# Patient Record
Sex: MALE | Race: WHITE | NOT HISPANIC OR LATINO | Employment: UNEMPLOYED | URBAN - NONMETROPOLITAN AREA
[De-identification: names, ages, dates, MRNs, and addresses within clinical notes are randomized per-mention and may not be internally consistent; named-entity substitution may affect disease eponyms.]

---

## 2023-07-17 ENCOUNTER — HOSPITAL ENCOUNTER (EMERGENCY)
Facility: HOSPITAL | Age: 38
Discharge: PSYCHIATRIC OR SUBSTANCE USE FACILITY | DRG: 897 | End: 2023-07-17
Attending: STUDENT IN AN ORGANIZED HEALTH CARE EDUCATION/TRAINING PROGRAM | Admitting: STUDENT IN AN ORGANIZED HEALTH CARE EDUCATION/TRAINING PROGRAM
Payer: COMMERCIAL

## 2023-07-17 ENCOUNTER — HOSPITAL ENCOUNTER (INPATIENT)
Facility: HOSPITAL | Age: 38
LOS: 5 days | Discharge: HOME | DRG: 897 | End: 2023-07-22
Attending: PSYCHIATRY & NEUROLOGY | Admitting: PSYCHIATRY & NEUROLOGY
Payer: COMMERCIAL

## 2023-07-17 VITALS
WEIGHT: 150 LBS | SYSTOLIC BLOOD PRESSURE: 144 MMHG | RESPIRATION RATE: 18 BRPM | TEMPERATURE: 99.1 F | OXYGEN SATURATION: 99 % | HEART RATE: 110 BPM | BODY MASS INDEX: 24.11 KG/M2 | DIASTOLIC BLOOD PRESSURE: 100 MMHG | HEIGHT: 66 IN

## 2023-07-17 DIAGNOSIS — F15.10 METHAMPHETAMINE ABUSE: ICD-10-CM

## 2023-07-17 DIAGNOSIS — F11.20 BUPRENORPHINE DEPENDENCE: ICD-10-CM

## 2023-07-17 DIAGNOSIS — F10.10 ALCOHOL ABUSE: Primary | ICD-10-CM

## 2023-07-17 PROBLEM — F19.10 SUBSTANCE ABUSE: Status: ACTIVE | Noted: 2023-07-17

## 2023-07-17 LAB
ALBUMIN SERPL-MCNC: 4.4 G/DL (ref 3.5–5.2)
ALBUMIN/GLOB SERPL: 1.2 G/DL
ALP SERPL-CCNC: 144 U/L (ref 39–117)
ALT SERPL W P-5'-P-CCNC: 72 U/L (ref 1–41)
AMPHET+METHAMPHET UR QL: POSITIVE
AMPHETAMINES UR QL: POSITIVE
ANION GAP SERPL CALCULATED.3IONS-SCNC: 14.9 MMOL/L (ref 5–15)
ANISOCYTOSIS BLD QL: NORMAL
AST SERPL-CCNC: 89 U/L (ref 1–40)
BACTERIA UR QL AUTO: ABNORMAL /HPF
BARBITURATES UR QL SCN: NEGATIVE
BASOPHILS # BLD AUTO: 0.05 10*3/MM3 (ref 0–0.2)
BASOPHILS NFR BLD AUTO: 0.6 % (ref 0–1.5)
BENZODIAZ UR QL SCN: NEGATIVE
BILIRUB SERPL-MCNC: 2.6 MG/DL (ref 0–1.2)
BILIRUB UR QL STRIP: ABNORMAL
BUN SERPL-MCNC: 24 MG/DL (ref 6–20)
BUN/CREAT SERPL: 27.3 (ref 7–25)
BUPRENORPHINE SERPL-MCNC: POSITIVE NG/ML
CALCIUM SPEC-SCNC: 9.9 MG/DL (ref 8.6–10.5)
CANNABINOIDS SERPL QL: POSITIVE
CHLORIDE SERPL-SCNC: 100 MMOL/L (ref 98–107)
CLARITY UR: CLEAR
CLUMPED PLATELETS: PRESENT
CO2 SERPL-SCNC: 17.1 MMOL/L (ref 22–29)
COCAINE UR QL: NEGATIVE
COLOR UR: ABNORMAL
CREAT SERPL-MCNC: 0.88 MG/DL (ref 0.76–1.27)
DEPRECATED RDW RBC AUTO: 59.9 FL (ref 37–54)
EGFRCR SERPLBLD CKD-EPI 2021: 114.3 ML/MIN/1.73
EOSINOPHIL # BLD AUTO: 0.14 10*3/MM3 (ref 0–0.4)
EOSINOPHIL NFR BLD AUTO: 1.5 % (ref 0.3–6.2)
ERYTHROCYTE [DISTWIDTH] IN BLOOD BY AUTOMATED COUNT: 13.2 % (ref 12.3–15.4)
ETHANOL BLD-MCNC: <10 MG/DL (ref 0–10)
ETHANOL UR QL: <0.01 %
FENTANYL UR-MCNC: NEGATIVE NG/ML
FLUAV RNA RESP QL NAA+PROBE: NOT DETECTED
FLUBV RNA RESP QL NAA+PROBE: NOT DETECTED
GLOBULIN UR ELPH-MCNC: 3.8 GM/DL
GLUCOSE SERPL-MCNC: 103 MG/DL (ref 65–99)
GLUCOSE UR STRIP-MCNC: NEGATIVE MG/DL
HCT VFR BLD AUTO: 34.1 % (ref 37.5–51)
HGB BLD-MCNC: 11.3 G/DL (ref 13–17.7)
HGB UR QL STRIP.AUTO: NEGATIVE
HYALINE CASTS UR QL AUTO: ABNORMAL /LPF
IMM GRANULOCYTES # BLD AUTO: 0.03 10*3/MM3 (ref 0–0.05)
IMM GRANULOCYTES NFR BLD AUTO: 0.3 % (ref 0–0.5)
KETONES UR QL STRIP: ABNORMAL
LEUKOCYTE ESTERASE UR QL STRIP.AUTO: ABNORMAL
LYMPHOCYTES # BLD AUTO: 1.54 10*3/MM3 (ref 0.7–3.1)
LYMPHOCYTES NFR BLD AUTO: 17 % (ref 19.6–45.3)
MACROCYTES BLD QL SMEAR: NORMAL
MAGNESIUM SERPL-MCNC: 2 MG/DL (ref 1.6–2.6)
MCH RBC QN AUTO: 40.6 PG (ref 26.6–33)
MCHC RBC AUTO-ENTMCNC: 33.1 G/DL (ref 31.5–35.7)
MCV RBC AUTO: 122.7 FL (ref 79–97)
METHADONE UR QL SCN: NEGATIVE
MONOCYTES # BLD AUTO: 0.8 10*3/MM3 (ref 0.1–0.9)
MONOCYTES NFR BLD AUTO: 8.8 % (ref 5–12)
NEUTROPHILS NFR BLD AUTO: 6.51 10*3/MM3 (ref 1.7–7)
NEUTROPHILS NFR BLD AUTO: 71.8 % (ref 42.7–76)
NITRITE UR QL STRIP: POSITIVE
NRBC BLD AUTO-RTO: 0 /100 WBC (ref 0–0.2)
OPIATES UR QL: NEGATIVE
OXYCODONE UR QL SCN: NEGATIVE
PCP UR QL SCN: NEGATIVE
PH UR STRIP.AUTO: 6 [PH] (ref 5–8)
PLATELET # BLD AUTO: 139 10*3/MM3 (ref 140–450)
PMV BLD AUTO: 11 FL (ref 6–12)
POTASSIUM SERPL-SCNC: 3.9 MMOL/L (ref 3.5–5.2)
PROPOXYPH UR QL: NEGATIVE
PROT SERPL-MCNC: 8.2 G/DL (ref 6–8.5)
PROT UR QL STRIP: ABNORMAL
RBC # BLD AUTO: 2.78 10*6/MM3 (ref 4.14–5.8)
RBC # UR STRIP: ABNORMAL /HPF
REF LAB TEST METHOD: ABNORMAL
SARS-COV-2 RNA RESP QL NAA+PROBE: NOT DETECTED
SODIUM SERPL-SCNC: 132 MMOL/L (ref 136–145)
SP GR UR STRIP: 1.03 (ref 1–1.03)
SQUAMOUS #/AREA URNS HPF: ABNORMAL /HPF
STOMATOCYTES BLD QL SMEAR: NORMAL
TRICYCLICS UR QL SCN: NEGATIVE
UROBILINOGEN UR QL STRIP: ABNORMAL
WBC # UR STRIP: ABNORMAL /HPF
WBC NRBC COR # BLD: 9.07 10*3/MM3 (ref 3.4–10.8)

## 2023-07-17 PROCEDURE — 36415 COLL VENOUS BLD VENIPUNCTURE: CPT

## 2023-07-17 PROCEDURE — 80053 COMPREHEN METABOLIC PANEL: CPT | Performed by: PHYSICIAN ASSISTANT

## 2023-07-17 PROCEDURE — 99285 EMERGENCY DEPT VISIT HI MDM: CPT

## 2023-07-17 PROCEDURE — 87636 SARSCOV2 & INF A&B AMP PRB: CPT | Performed by: PHYSICIAN ASSISTANT

## 2023-07-17 PROCEDURE — 82077 ASSAY SPEC XCP UR&BREATH IA: CPT | Performed by: PHYSICIAN ASSISTANT

## 2023-07-17 PROCEDURE — 85007 BL SMEAR W/DIFF WBC COUNT: CPT | Performed by: PHYSICIAN ASSISTANT

## 2023-07-17 PROCEDURE — 80307 DRUG TEST PRSMV CHEM ANLYZR: CPT | Performed by: PHYSICIAN ASSISTANT

## 2023-07-17 PROCEDURE — 81001 URINALYSIS AUTO W/SCOPE: CPT | Performed by: PHYSICIAN ASSISTANT

## 2023-07-17 PROCEDURE — 93010 ELECTROCARDIOGRAM REPORT: CPT | Performed by: INTERNAL MEDICINE

## 2023-07-17 PROCEDURE — 85025 COMPLETE CBC W/AUTO DIFF WBC: CPT | Performed by: PHYSICIAN ASSISTANT

## 2023-07-17 PROCEDURE — 93005 ELECTROCARDIOGRAM TRACING: CPT | Performed by: PSYCHIATRY & NEUROLOGY

## 2023-07-17 PROCEDURE — 83735 ASSAY OF MAGNESIUM: CPT | Performed by: PHYSICIAN ASSISTANT

## 2023-07-17 RX ORDER — IBUPROFEN 400 MG/1
400 TABLET ORAL EVERY 6 HOURS PRN
Status: DISCONTINUED | OUTPATIENT
Start: 2023-07-17 | End: 2023-07-22 | Stop reason: HOSPADM

## 2023-07-17 RX ORDER — MULTIVITAMIN WITH IRON
2 TABLET ORAL DAILY
Status: DISCONTINUED | OUTPATIENT
Start: 2023-07-18 | End: 2023-07-22 | Stop reason: HOSPADM

## 2023-07-17 RX ORDER — MULTIPLE VITAMINS W/ MINERALS TAB 9MG-400MCG
1 TAB ORAL DAILY
Status: DISCONTINUED | OUTPATIENT
Start: 2023-07-18 | End: 2023-07-22 | Stop reason: HOSPADM

## 2023-07-17 RX ORDER — CLONIDINE HYDROCHLORIDE 0.1 MG/1
0.1 TABLET ORAL ONCE AS NEEDED
Status: DISPENSED | OUTPATIENT
Start: 2023-07-21 | End: 2023-07-22

## 2023-07-17 RX ORDER — NICOTINE 21 MG/24HR
1 PATCH, TRANSDERMAL 24 HOURS TRANSDERMAL
Status: DISCONTINUED | OUTPATIENT
Start: 2023-07-18 | End: 2023-07-22 | Stop reason: HOSPADM

## 2023-07-17 RX ORDER — DICYCLOMINE HYDROCHLORIDE 10 MG/1
10 CAPSULE ORAL 3 TIMES DAILY PRN
Status: DISCONTINUED | OUTPATIENT
Start: 2023-07-17 | End: 2023-07-22 | Stop reason: HOSPADM

## 2023-07-17 RX ORDER — CYCLOBENZAPRINE HCL 10 MG
10 TABLET ORAL 3 TIMES DAILY PRN
Status: DISCONTINUED | OUTPATIENT
Start: 2023-07-17 | End: 2023-07-22 | Stop reason: HOSPADM

## 2023-07-17 RX ORDER — HYDROXYZINE 50 MG/1
50 TABLET, FILM COATED ORAL EVERY 6 HOURS PRN
Status: DISCONTINUED | OUTPATIENT
Start: 2023-07-17 | End: 2023-07-18

## 2023-07-17 RX ORDER — AMOXICILLIN AND CLAVULANATE POTASSIUM 875; 125 MG/1; MG/1
1 TABLET, FILM COATED ORAL ONCE
Status: COMPLETED | OUTPATIENT
Start: 2023-07-17 | End: 2023-07-17

## 2023-07-17 RX ORDER — LORAZEPAM 2 MG/1
2 TABLET ORAL EVERY 4 HOURS PRN
Status: ACTIVE | OUTPATIENT
Start: 2023-07-18 | End: 2023-07-19

## 2023-07-17 RX ORDER — LORAZEPAM 2 MG/1
2 TABLET ORAL
Status: COMPLETED | OUTPATIENT
Start: 2023-07-18 | End: 2023-07-18

## 2023-07-17 RX ORDER — CLONIDINE HYDROCHLORIDE 0.1 MG/1
0.1 TABLET ORAL 4 TIMES DAILY PRN
Status: ACTIVE | OUTPATIENT
Start: 2023-07-18 | End: 2023-07-19

## 2023-07-17 RX ORDER — LORAZEPAM 1 MG/1
1 TABLET ORAL
Status: COMPLETED | OUTPATIENT
Start: 2023-07-20 | End: 2023-07-20

## 2023-07-17 RX ORDER — BENZTROPINE MESYLATE 1 MG/1
2 TABLET ORAL ONCE AS NEEDED
Status: DISCONTINUED | OUTPATIENT
Start: 2023-07-17 | End: 2023-07-22 | Stop reason: HOSPADM

## 2023-07-17 RX ORDER — LORAZEPAM 1 MG/1
1 TABLET ORAL EVERY 4 HOURS PRN
Status: ACTIVE | OUTPATIENT
Start: 2023-07-20 | End: 2023-07-21

## 2023-07-17 RX ORDER — ACETAMINOPHEN 325 MG/1
650 TABLET ORAL EVERY 6 HOURS PRN
Status: DISCONTINUED | OUTPATIENT
Start: 2023-07-17 | End: 2023-07-17

## 2023-07-17 RX ORDER — BENZONATATE 100 MG/1
100 CAPSULE ORAL 3 TIMES DAILY PRN
Status: DISCONTINUED | OUTPATIENT
Start: 2023-07-17 | End: 2023-07-22 | Stop reason: HOSPADM

## 2023-07-17 RX ORDER — CLONIDINE HYDROCHLORIDE 0.1 MG/1
0.1 TABLET ORAL 2 TIMES DAILY PRN
Status: ACTIVE | OUTPATIENT
Start: 2023-07-20 | End: 2023-07-21

## 2023-07-17 RX ORDER — LORAZEPAM 0.5 MG/1
0.5 TABLET ORAL EVERY 4 HOURS PRN
Status: ACTIVE | OUTPATIENT
Start: 2023-07-21 | End: 2023-07-22

## 2023-07-17 RX ORDER — TRAZODONE HYDROCHLORIDE 50 MG/1
50 TABLET ORAL NIGHTLY PRN
Status: DISCONTINUED | OUTPATIENT
Start: 2023-07-17 | End: 2023-07-18

## 2023-07-17 RX ORDER — LOPERAMIDE HYDROCHLORIDE 2 MG/1
2 CAPSULE ORAL
Status: DISCONTINUED | OUTPATIENT
Start: 2023-07-17 | End: 2023-07-22 | Stop reason: HOSPADM

## 2023-07-17 RX ORDER — FAMOTIDINE 20 MG/1
20 TABLET, FILM COATED ORAL 2 TIMES DAILY PRN
Status: DISCONTINUED | OUTPATIENT
Start: 2023-07-17 | End: 2023-07-22 | Stop reason: HOSPADM

## 2023-07-17 RX ORDER — CLONIDINE HYDROCHLORIDE 0.1 MG/1
0.1 TABLET ORAL 4 TIMES DAILY PRN
Status: ACTIVE | OUTPATIENT
Start: 2023-07-17 | End: 2023-07-18

## 2023-07-17 RX ORDER — LORAZEPAM 2 MG/1
2 TABLET ORAL
Status: DISPENSED | OUTPATIENT
Start: 2023-07-17 | End: 2023-07-18

## 2023-07-17 RX ORDER — AMOXICILLIN AND CLAVULANATE POTASSIUM 875; 125 MG/1; MG/1
1 TABLET, FILM COATED ORAL EVERY 12 HOURS SCHEDULED
Status: DISCONTINUED | OUTPATIENT
Start: 2023-07-17 | End: 2023-07-22 | Stop reason: HOSPADM

## 2023-07-17 RX ORDER — CLONIDINE HYDROCHLORIDE 0.1 MG/1
0.1 TABLET ORAL 3 TIMES DAILY PRN
Status: ACTIVE | OUTPATIENT
Start: 2023-07-19 | End: 2023-07-20

## 2023-07-17 RX ORDER — ECHINACEA PURPUREA EXTRACT 125 MG
2 TABLET ORAL AS NEEDED
Status: DISCONTINUED | OUTPATIENT
Start: 2023-07-17 | End: 2023-07-22 | Stop reason: HOSPADM

## 2023-07-17 RX ORDER — BENZTROPINE MESYLATE 1 MG/ML
1 INJECTION INTRAMUSCULAR; INTRAVENOUS ONCE AS NEEDED
Status: DISCONTINUED | OUTPATIENT
Start: 2023-07-17 | End: 2023-07-22 | Stop reason: HOSPADM

## 2023-07-17 RX ORDER — LORAZEPAM 0.5 MG/1
0.5 TABLET ORAL
Status: COMPLETED | OUTPATIENT
Start: 2023-07-21 | End: 2023-07-21

## 2023-07-17 RX ORDER — HYDRALAZINE HYDROCHLORIDE 25 MG/1
25 TABLET, FILM COATED ORAL DAILY PRN
Status: DISCONTINUED | OUTPATIENT
Start: 2023-07-17 | End: 2023-07-22 | Stop reason: HOSPADM

## 2023-07-17 RX ORDER — ALUMINA, MAGNESIA, AND SIMETHICONE 2400; 2400; 240 MG/30ML; MG/30ML; MG/30ML
15 SUSPENSION ORAL EVERY 6 HOURS PRN
Status: DISCONTINUED | OUTPATIENT
Start: 2023-07-17 | End: 2023-07-22 | Stop reason: HOSPADM

## 2023-07-17 RX ORDER — ONDANSETRON 4 MG/1
4 TABLET, FILM COATED ORAL EVERY 6 HOURS PRN
Status: DISCONTINUED | OUTPATIENT
Start: 2023-07-17 | End: 2023-07-18

## 2023-07-17 RX ADMIN — LORAZEPAM 2 MG: 2 TABLET ORAL at 21:35

## 2023-07-17 RX ADMIN — AMOXICILLIN AND CLAVULANATE POTASSIUM 1 TABLET: 875; 125 TABLET, FILM COATED ORAL at 20:17

## 2023-07-17 NOTE — ED PROVIDER NOTES
"Subjective   History of Present Illness  Patient comes in with complaint of alcohol abuse, methamphetamine abuse, Suboxone abuse.  Patient requesting detox    History provided by:  Patient   used: No    Alcohol Intoxication  Location:  Call  Severity:  Moderate  Onset quality:  Gradual  Duration:  2 days  Timing:  Intermittent  Progression:  Worsening  Chronicity:  New  Associated symptoms: no chest pain, no cough, no diarrhea, no ear pain, no fever, no headaches, no loss of consciousness, no myalgias, no rash, no rhinorrhea, no shortness of breath, no sore throat, no vomiting and no wheezing      Review of Systems   Constitutional:  Negative for fever.   HENT:  Negative for ear pain, rhinorrhea and sore throat.    Eyes: Negative.  Negative for pain and itching.   Respiratory:  Negative for cough, shortness of breath and wheezing.    Cardiovascular:  Negative for chest pain.   Gastrointestinal:  Negative for diarrhea and vomiting.   Endocrine: Negative for cold intolerance and polyuria.   Genitourinary: Negative.  Negative for frequency, hematuria and penile discharge.   Musculoskeletal: Negative.  Negative for myalgias.   Skin:  Negative for pallor and rash.   Neurological:  Negative for loss of consciousness and headaches.   Hematological: Negative.    Psychiatric/Behavioral: Negative.  Negative for confusion. The patient is not hyperactive.    All other systems reviewed and are negative.    Past Medical History:   Diagnosis Date    Acid reflux     Alcohol abuse     HTN (hypertension)        No Known Allergies    Past Surgical History:   Procedure Laterality Date    FOOT SURGERY Left     HIP FUSION Left     age 24    SKIN GRAFT Right     \"left face, left shoulder, bilat hands, left foot\" age 8       Family History   Problem Relation Age of Onset    Alcohol abuse Mother     Alcohol abuse Father        Social History     Socioeconomic History    Marital status: Legally     Number of " children: 1    Years of education: 12    Highest education level: High school graduate   Tobacco Use    Smoking status: Every Day     Packs/day: 1.00     Years: 17.00     Pack years: 17.00     Types: Cigarettes    Smokeless tobacco: Never   Vaping Use    Vaping Use: Never used   Substance and Sexual Activity    Alcohol use: Yes     Comment: see below    Drug use: Yes     Types: Marijuana, Amphetamines, Methamphetamines, Other     Comment: alcohol, suboxone, opiates    Sexual activity: Yes     Partners: Female           Objective   Physical Exam  Vitals and nursing note reviewed.   Constitutional:       General: He is not in acute distress.     Appearance: Normal appearance. He is normal weight. He is not ill-appearing, toxic-appearing or diaphoretic.   HENT:      Head: Normocephalic and atraumatic.      Right Ear: Tympanic membrane, ear canal and external ear normal. There is no impacted cerumen.      Left Ear: Tympanic membrane, ear canal and external ear normal. There is no impacted cerumen.      Nose: Nose normal. No congestion or rhinorrhea.      Comments: Tongue laceration, infection of tongue         Mouth/Throat:      Mouth: Mucous membranes are moist.      Pharynx: Oropharynx is clear. No oropharyngeal exudate or posterior oropharyngeal erythema.   Eyes:      General: No scleral icterus.        Right eye: No discharge.         Left eye: No discharge.      Extraocular Movements: Extraocular movements intact.      Conjunctiva/sclera: Conjunctivae normal.      Pupils: Pupils are equal, round, and reactive to light.   Cardiovascular:      Rate and Rhythm: Normal rate and regular rhythm.      Pulses: Normal pulses.      Heart sounds: Normal heart sounds. No murmur heard.    No friction rub. No gallop.   Pulmonary:      Effort: Pulmonary effort is normal. No respiratory distress.      Breath sounds: Normal breath sounds. No stridor. No wheezing, rhonchi or rales.   Abdominal:      General: Abdomen is flat. Bowel  sounds are normal. There is no distension.      Palpations: There is no mass.      Tenderness: There is no abdominal tenderness. There is no right CVA tenderness, left CVA tenderness, guarding or rebound.      Hernia: No hernia is present.   Musculoskeletal:         General: No swelling, tenderness, deformity or signs of injury. Normal range of motion.      Cervical back: Normal range of motion. No tenderness.      Right lower leg: No edema.   Lymphadenopathy:      Cervical: No cervical adenopathy.   Skin:     General: Skin is warm and dry.      Coloration: Skin is not jaundiced or pale.      Findings: No bruising, erythema or lesion.   Neurological:      General: No focal deficit present.      Mental Status: He is alert and oriented to person, place, and time. Mental status is at baseline.      Cranial Nerves: No cranial nerve deficit.      Sensory: No sensory deficit.      Motor: No weakness.      Coordination: Coordination normal.      Gait: Gait normal.      Deep Tendon Reflexes: Reflexes normal.   Psychiatric:         Mood and Affect: Mood normal.         Behavior: Behavior normal.         Thought Content: Thought content normal.         Judgment: Judgment normal.       Procedures           ED Course  ED Course as of 07/18/23 0809   Mon Jul 17, 2023 1948 Dr. Hawkins to take over patient.  []      ED Course User Index  [] Hitesh Dyson PA-C                                           Medical Decision Making  Problems Addressed:  Alcohol abuse: complicated acute illness or injury  Buprenorphine dependence: complicated acute illness or injury  Methamphetamine abuse: complicated acute illness or injury    Amount and/or Complexity of Data Reviewed  Labs: ordered.    Risk  Prescription drug management.        Final diagnoses:   Alcohol abuse   Buprenorphine dependence   Methamphetamine abuse       ED Disposition  ED Disposition       ED Disposition   DC/Transfer to Behavioral Health Condition Stable     Comment   --               No follow-up provider specified.       Medication List      No changes were made to your prescriptions during this visit.            Hitesh Dyson PA-C  07/17/23 1812       Hitesh Dyson PA-C  07/17/23 1939       Hitesh Dyson PA-C  07/18/23 0809

## 2023-07-18 PROBLEM — F15.20 METHAMPHETAMINE USE DISORDER, SEVERE: Status: ACTIVE | Noted: 2023-07-18

## 2023-07-18 PROBLEM — F11.93 OPIOID WITHDRAWAL: Status: ACTIVE | Noted: 2023-07-18

## 2023-07-18 PROBLEM — F10.20 ALCOHOL USE DISORDER, SEVERE, DEPENDENCE: Status: ACTIVE | Noted: 2023-07-18

## 2023-07-18 PROBLEM — F11.20 OPIOID USE DISORDER, SEVERE, DEPENDENCE: Status: ACTIVE | Noted: 2023-07-18

## 2023-07-18 PROBLEM — F10.939 ALCOHOL WITHDRAWAL: Status: ACTIVE | Noted: 2023-07-18

## 2023-07-18 PROCEDURE — 99223 1ST HOSP IP/OBS HIGH 75: CPT | Performed by: PSYCHIATRY & NEUROLOGY

## 2023-07-18 RX ADMIN — LORAZEPAM 2 MG: 2 TABLET ORAL at 15:42

## 2023-07-18 RX ADMIN — Medication 1 TABLET: at 08:18

## 2023-07-18 RX ADMIN — CYCLOBENZAPRINE 10 MG: 10 TABLET, FILM COATED ORAL at 15:42

## 2023-07-18 RX ADMIN — HYDROXYZINE HYDROCHLORIDE 50 MG: 50 TABLET ORAL at 08:18

## 2023-07-18 RX ADMIN — Medication 2 TABLET: at 08:18

## 2023-07-18 RX ADMIN — LORAZEPAM 2 MG: 2 TABLET ORAL at 08:18

## 2023-07-18 RX ADMIN — AMOXICILLIN AND CLAVULANATE POTASSIUM 1 TABLET: 875; 125 TABLET, FILM COATED ORAL at 21:50

## 2023-07-18 RX ADMIN — LORAZEPAM 2 MG: 2 TABLET ORAL at 21:50

## 2023-07-18 RX ADMIN — AMOXICILLIN AND CLAVULANATE POTASSIUM 1 TABLET: 875; 125 TABLET, FILM COATED ORAL at 08:17

## 2023-07-18 RX ADMIN — Medication 100 MG: at 08:18

## 2023-07-18 RX ADMIN — CYCLOBENZAPRINE 10 MG: 10 TABLET, FILM COATED ORAL at 08:18

## 2023-07-18 NOTE — PLAN OF CARE
Goal Outcome Evaluation:  Plan of Care Reviewed With: patient  Patient Agreement with Plan of Care: agrees     Progress: no change  Outcome Evaluation: Patient alert and oriented this am but has periods of confusion at times. Patient noted with tremors, body aches, and leg cramps. Patient has a poor appetite and disrupted sleep. Patient isolates in room most of this shift resting. No acute distress noted, will continue to monitor.

## 2023-07-18 NOTE — H&P
INITIAL PSYCHIATRIC HISTORY & PHYSICAL    Patient Identification:  Name:  Aniceto Toledo  Age:  38 y.o.  Sex:  male  :  1985  MRN:  [id number]   Visit Number:  [id number]  Primary Care Physician:  Provider, No Known    SUBJECTIVE    CC/Focus of Exam: alcohol use    HPI: Aniceto Toledo is a 38 y.o. male who was admitted on 2023 with complaints of alcohol and opoid  use and withdrawals. The patient reports a long history of substance use. First use was at age 23 when he was prescribed pain pills for injury to left heel. He started drinking at age 35. Over time the use increased and the patient  continued to use despite negative consequences. The patient endorses symptoms of tolerance and withdrawals. Has tried to cut down and stop but has not been successful. Spends too much time and resources in pursuit of substance use. Longest period of sobriety is reported to be 3.5 years.  Currently using about a half a gallon of vodka daily and Suboxone 8-2 mg daily orally.   Last use of Suboxone was yesterday, alcohol was 3 days ago.   Withdrawal symptoms include tremors, weakness, fatigue.     PAST PSYCHIATRIC HX: Patient reports he has been treated for anxiety.     SUBSTANCE USE HX: See HPI. Patient also reports using a quarter of gram of meth daily intranasally and has been using it regularly since age 29. Last use was 3 days ago.     SOCIAL HX:   Social History     Socioeconomic History    Marital status: Legally     Number of children: 1    Years of education: 12    Highest education level: High school graduate   Tobacco Use    Smoking status: Every Day     Packs/day: 1.00     Years: 17.00     Pack years: 17.00     Types: Cigarettes    Smokeless tobacco: Never   Vaping Use    Vaping Use: Never used   Substance and Sexual Activity    Alcohol use: Yes     Comment: see below    Drug use: Yes     Types: Marijuana, Amphetamines, Methamphetamines, Other     Comment: alcohol, suboxone, opiates     "Sexual activity: Yes     Partners: Female         Past Medical History:   Diagnosis Date    Acid reflux     Alcohol abuse     HTN (hypertension)           Past Surgical History:   Procedure Laterality Date    FOOT SURGERY Left     HIP FUSION Left     age 24    SKIN GRAFT Right     \"left face, left shoulder, bilat hands, left foot\" age 8       Family History   Problem Relation Age of Onset    Alcohol abuse Mother     Alcohol abuse Father          No medications prior to admission.         ALLERGIES:  Patient has no known allergies.    Temp:  [96.9 °F (36.1 °C)-99.1 °F (37.3 °C)] 96.9 °F (36.1 °C)  Heart Rate:  [] 82  Resp:  [16-20] 17  BP: ()/() 93/59    REVIEW OF SYSTEMS:  Review of Systems   Constitutional:  Positive for diaphoresis and fatigue.   HENT: Negative.     Eyes: Negative.    Cardiovascular: Negative.    Gastrointestinal:  Positive for abdominal pain and nausea.   Endocrine: Negative.    Genitourinary: Negative.    Musculoskeletal:  Positive for myalgias.   Skin: Negative.    Allergic/Immunologic: Negative.    Neurological:  Positive for tremors and weakness.   Hematological: Negative.    Psychiatric/Behavioral:  Positive for dysphoric mood. The patient is nervous/anxious.       OBJECTIVE    PHYSICAL EXAM:  Physical Exam  Constitutional:  Appears well-developed and well-nourished.   HENT:   Head: Normocephalic and atraumatic.   Right Ear: External ear normal.   Left Ear: External ear normal.   Mouth/Throat: Oropharynx is clear and moist.   Eyes: Pupils are equal, round, and reactive to light. Conjunctivae and EOM are normal.   Neck: Normal range of motion. Neck supple.   Cardiovascular: Normal rate, regular rhythm and normal heart sounds.    Respiratory: Effort normal and breath sounds normal. No respiratory distress. No wheezes.   GI: Soft. Bowel sounds are normal.No distension. There is no tenderness.   Musculoskeletal: Normal range of motion. No edema or deformity.   Neurological:No " cranial nerve deficit. Coordination normal.   Skin: Skin is warm and dry. No rash noted. No erythema.     MENTAL STATUS EXAM:   Hygiene:   fair  Cooperation:  Cooperative  Eye Contact:  Fair  Psychomotor Behavior:  Appropriate  Affect:  Appropriate  Hopelessness: Denies  Speech:  Normal  Thought Process: Goal directed  Thought Content:  Normal  Suicidal:  None  Homicidal:  None  Hallucinations:  None  Delusion:  None  Memory:  Intact  Orientation:  Person, Place, Time and Situation  Reliability:  fair  Insight:  Fair  Judgement:  Fair  Impulse Control:  Fair    Imaging Results (Last 24 Hours)       ** No results found for the last 24 hours. **             ECG/EMG Results (most recent)       Procedure Component Value Units Date/Time    ECG 12 Lead Other [[id number]] Collected: 07/17/23 2225     Updated: 07/17/23 2226     QT Interval 376 ms      QTC Interval 487 ms     Narrative:      Test Reason : Other~  Blood Pressure :   */*   mmHG  Vent. Rate : 101 BPM     Atrial Rate : 101 BPM     P-R Int : 148 ms          QRS Dur :  86 ms      QT Int : 376 ms       P-R-T Axes :  82  94  80 degrees     QTc Int : 487 ms    Sinus tachycardia  Possible Left atrial enlargement  Rightward axis  Borderline ECG  No previous ECGs available    Referred By:            Confirmed By:              Lab Results   Component Value Date    GLUCOSE 103 (H) 07/17/2023    BUN 24 (H) 07/17/2023    CREATININE 0.88 07/17/2023    BCR 27.3 (H) 07/17/2023    CO2 17.1 (L) 07/17/2023    CALCIUM 9.9 07/17/2023    ALBUMIN 4.4 07/17/2023    AST 89 (H) 07/17/2023    ALT 72 (H) 07/17/2023       Lab Results   Component Value Date    WBC 9.07 07/17/2023    HGB 11.3 (L) 07/17/2023    HCT 34.1 (L) 07/17/2023    .7 (H) 07/17/2023     (L) 07/17/2023       Last Urine Toxicity          Latest Ref Rng & Units 7/17/2023   LAST URINE TOXICITY RESULTS   Amphetamine, Urine Qual Negative Positive    Barbiturates Screen, Urine Negative Negative    Benzodiazepine  Screen, Urine Negative Negative    Buprenorphine, Screen, Urine Negative Positive    Cocaine Screen, Urine Negative Negative    Fentanyl, Urine Negative Negative    Methadone Screen , Urine Negative Negative    Methamphetamine, Ur Negative Positive        Brief Urine Lab Results  (Last result in the past 365 days)        Color   Clarity   Blood   Leuk Est   Nitrite   Protein   CREAT   Urine HCG        07/17/23 1945 Dark Yellow   Clear   Negative   Trace   Positive   30 mg/dL (1+)                   DATA  Labs reviewed.  Sodium 132, glucose 103, alkaline phosphatase 144, AST 89, ALT 72, total bilirubin 2.6. Hemoglobin 11.3, hematocrit 34.1, platelets 139, .7, MCH 40.6. UA show dark yellow color, trace ketones, nitrite positive, trace leukocyte esterase, protein, bilirubin, urobilinogen, 3-5 WBC, 3-6 sq epi cells. UDS positive for amphetamine, buprenorphine, methamphetamine, thc.    EKG reviewed. QTc interval 487.   JONA reviewed.   Record reviewed. No previous treatment noted in this hospital for mental health or substance use problems.       Strengths: Motivated for treatment    Weaknesses:Substance use and Poor coping skills    Code status:  Full  Discussed code status with patient.    ASSESSMENT & PLAN:        Alcohol use disorder, severe, dependence    Alcohol withdrawal  -Ativan detox  -Thiamine and folate      Opioid use disorder, severe, dependence    Opioid withdrawal  -Clonidine detox      Methamphetamine use disorder, severe  -Supportive treatment      Oral infection  -Augmentin      UTI  -UA positive for nitrites. Augmentin will help.         The patient has been admitted for safety and stabilization.  Patient will be monitored for suicidality daily and maintained on Special Precautions Level 4 (q30 min checks).  The patient will have individual and group therapy with a master's level therapist. A master treatment plan will be developed and agreed upon by the patient and his/her treatment team.   The patient's estimated length of stay in the hospital is 5-7 days.

## 2023-07-18 NOTE — PLAN OF CARE
Goal Outcome Evaluation:        Problem: Adult Behavioral Health Plan of Care  Goal: Patient-Specific Goal (Individualization)  Outcome: Ongoing, Progressing  Flowsheets  Taken 7/18/2023 1408 by Geraldine Webster CSW  Patient-Specific Goals (Include Timeframe): Identify 2-3 coping skills, address relapse prevention methods, complete aftercare plans, and deny SI/HI prior to discharge.  Individualized Care Needs: Therapist to offer 1-4 therapy sessions, aftercare planning, safety planning, family education, group therapy, and brief CBT/MI interventions.  Taken 7/18/2023 1043 by Geraldine Webster CSW  Patient Personal Strengths:   resilient   resourceful   motivated for treatment   motivated for recovery   expressive of needs   expressive of emotions   parenting skills   stable living environment   positive educational history   relationship stability   family/social support   independent living skills   self-awareness  Patient Vulnerabilities:   occupational insecurity   history of unsuccessful treatment   poor impulse control   substance abuse/addiction   adverse childhood experience(s)  Taken 7/17/2023 2140 by Diane Salazar RN  Anxieties, Fears or Concerns: None verbalized  Goal: Optimized Coping Skills in Response to Life Stressors  Outcome: Ongoing, Progressing  Flowsheets (Taken 7/18/2023 1408)  Optimized Coping Skills in Response to Life Stressors: making progress toward outcome  Intervention: Promote Effective Coping Strategies  Flowsheets (Taken 7/18/2023 0914 by Alea Cuba, RN)  Supportive Measures:   counseling provided   decision-making supported   goal-setting facilitated   self-care encouraged   self-reflection promoted   self-responsibility promoted   relaxation techniques promoted   positive reinforcement provided  Goal: Develops/Participates in Therapeutic [place] to Support Successful Transition  Outcome: Ongoing, Progressing  Flowsheets (Taken 7/18/2023  1408)  Develops/Participates in Therapeutic [place] to Support Successful Transition: making progress toward outcome  Intervention: Foster Therapeutic [place]  Flowsheets (Taken 7/18/2023 1409)  Trust Relationship/Rapport:   care explained   reassurance provided   choices provided   thoughts/feelings acknowledged   emotional support provided   questions answered   empathic listening provided   questions encouraged  Intervention: Mutually Develop Transition Plan  Flowsheets  Taken 7/18/2023 1403  Outpatient/Agency/Support Group Needs: residential services  Transition Support:   follow-up care discussed   community resources reviewed   crisis management plan promoted   crisis management plan verbalized   follow-up care coordinated  Anticipated Discharge Disposition: residential substance use unit  Taken 7/18/2023 1406  Discharge Coordination/Progress: Patient has [employer]. Therapist met with patient to complete assessment, patient difficult to wake at this time and majority of information pulled from chart. Patient was brought in be a residential rehab but has been unsure of the name of the rehab.  Transportation Anticipated: agency  Transportation Concerns: none  Current Discharge Risk: substance use/abuse  Concerns to be Addressed:   substance/tobacco abuse/use   coping/stress   mental health   discharge planning   cognitive/perceptual  Readmission Within the Last 30 Days: no previous admission in last 30 days  Patient/Family Anticipated Services at Transition: rehabilitation services  Patient's Choice of Community Agency(s): To be determined  Patient/Family Anticipates Transition to: inpatient rehabilitation facility  Offered/Gave Vendor List: no         DATA:         Therapist met individually with patient this date to introduce role and to discuss hospitalization expectations. Patient agreeable.     Consent in chartlet for girlfriend.     Clinical Maneuvering/Intervention:     Therapist assisted patient  in processing above session content; acknowledged and normalized patient’s thoughts, feelings, and concerns.  Discussed the therapist/patient relationship and explain the parameters and limitations of relative confidentiality.  Also discussed the importance of active participation, and honesty to the treatment process.  Encouraged the patient to discuss/vent their feelings, frustrations, and fears concerning their ongoing medical issues and validated their feelings.     Discussed the importance of finding enjoyable activities and coping skills that the patient can engage in a regular basis. Discussed healthy coping skills such as distraction, self love, grounding, thought challenges/reframing, etc.  Provided patient with list of healthy coping skills this date. Discussed the importance of medication compliance.  Praised the patient for seeking help and spent the majority of the session building rapport.       Allowed patient to freely discuss issues without interruption or judgment. Provided safe, confidential environment to facilitate the development of positive therapeutic relationship and encourage open, honest communication.      Therapist addressed discharge safety planning this date. Assisted patient in identifying risk factors which would indicate the need for higher level of care after discharge;  including thoughts to harm self or others and/or self-harming behavior. Encouraged patient to call 911, or present to the nearest emergency room should any of these events occur. Discussed crisis intervention services and means to access.  Encouraged securing any objects of harm.       Therapist completed integrated summary, treatment plan, and initiated social history this date.  Therapist is strongly encouraging family involvement in treatment.       ASSESSMENT:      The patient is a 37 y/o  male admitted for detox treatment. Therapist attempted to meet with patient on this date to complete assessment,  patient was difficult to wake at this time and as a result majority of information was pulled from chart. Patient has denied SI/HI/AVH, reported experiencing tremors, weakness, and fatigue. Patient reports use of alcohol, suboxone without a prescription, and methamphetamine. Patient lives at home with his girlfriend, girlfriend's father, and two children. Patient had reported being brought in by a residential rehab but has been unsure of the name of the rehab, will continue to assess. No past [facility] admissions. Patient began using at age 23, longest period of sobriety was 3.5 years. Patient is agreeable for girlfriend to be involved in treatment.      PLAN:       Patient to remain hospitalized this date.     Treatment team will focus efforts on stabilizing patient's acute symptoms while providing education on healthy coping and crisis management to reduce hospitalizations.   Patient requires daily psychiatrist evaluation and 24/7 nursing supervision to promote patient  safety.     Therapist will offer 1-4 individual sessions, 1 therapy group daily, family education, and appropriate referral.    Therapist recommends [facility] residential rehab.

## 2023-07-18 NOTE — NURSING NOTE
Spoke with lead, AUSTIN So for bed assignment. Advised lead that RN contacted Dr. SOTERO Sampson in error, but he gave admit orders.

## 2023-07-18 NOTE — NURSING NOTE
Spoke to Dr. SOTERO Sampson via phone. Intake information provided. Instructed to admit the patient. Admit orders received. SP4 routine orders. Ativan and clonidine detox protocol. Augmentin 875mg bid x 10 days for oral infection. [id number]. Patient and ED provider/S[place]au made aware of plan of care. Safety precautions maintained.

## 2023-07-18 NOTE — PLAN OF CARE
Problem: Adult Behavioral Health Plan of Care  Goal: Plan of Care Review  Recent Flowsheet Documentation  Taken 7/17/2023 2140 by Diane Salazar, RN  Plan of Care Reviewed With: patient  Patient Agreement with Plan of Care: agrees   Goal Outcome Evaluation:  Plan of Care Reviewed With: patient  Patient Agreement with Plan of Care: agrees         New admission.  Care plan initiated.

## 2023-07-18 NOTE — NURSING NOTE
"39 y/o presents with request for detox from alcohol, meth, and suboxone. Pt reports he was sent from [facility] to a rehab facility today. Pt reports rehab brought him here, but unable to provide information as to name of rehab.    Pt reports he drink 3/4 of a 1/2 gallon vodka on daily basis. Reports last drink was two days ago. Pt reports past history of DT's. Pt reports using meth \"snorting\" about every 3 months. Last use was 4 days ago. Pt reports THC use about every 2 weeks with last use yesterday.  Pt reports daily use of non-prescribed Suboxone. Reports he uses 1/2 of 8mg strip twice daily. Pt reports he used 2 8mg strips earlier today. Pt reports he previously was prescribed suboxone, but no currently.    Denies opiate use since 2012.    Depression 5/10.  Anxiety 0/10.    Appetite good.  Sleep poor.    Reports can't keep a job. Feelings of wishing to be dead since quitting job three weeks ago. Denies SI/HI/AVH.    Pt reports he was drinking four days ago, fell off porch, and bit tongue. Tongue swollen, raw areas, white patches. Speech thick. ED provider, major Dyson and prescribed Augmentin 875mg bid x 10 days.    ED care was turned over to Dr. Sevilla who reviewed rest of labs and provided medical clearance.     Na 132  CO2 17.1  BUN 24  Glucose 103  Alk Phos 144  AST 89  ALT 72  Total Bili 2.6  RBC 2.78  Hgb 11.3  Plt 139    U/A  Dark Yellow  Ketones trace  Nitrite positive  Leukocytes trace  Protein 30  Bilirubin small  Urobilinogen 2.0  WBC 3-5  Squamous Epi 3-6    UDS  Amphetamine  Buprenorphine   Methamphetamine  THC    "

## 2023-07-19 LAB
QT INTERVAL: 376 MS
QTC INTERVAL: 487 MS

## 2023-07-19 PROCEDURE — 99232 SBSQ HOSP IP/OBS MODERATE 35: CPT | Performed by: PSYCHIATRY & NEUROLOGY

## 2023-07-19 RX ADMIN — Medication 100 MG: at 08:03

## 2023-07-19 RX ADMIN — LORAZEPAM 1.5 MG: 0.5 TABLET ORAL at 08:03

## 2023-07-19 RX ADMIN — LORAZEPAM 1.5 MG: 0.5 TABLET ORAL at 22:16

## 2023-07-19 RX ADMIN — LORAZEPAM 1.5 MG: 0.5 TABLET ORAL at 14:08

## 2023-07-19 RX ADMIN — Medication 2 TABLET: at 08:03

## 2023-07-19 RX ADMIN — AMOXICILLIN AND CLAVULANATE POTASSIUM 1 TABLET: 875; 125 TABLET, FILM COATED ORAL at 08:02

## 2023-07-19 RX ADMIN — CYCLOBENZAPRINE 10 MG: 10 TABLET, FILM COATED ORAL at 08:03

## 2023-07-19 RX ADMIN — AMOXICILLIN AND CLAVULANATE POTASSIUM 1 TABLET: 875; 125 TABLET, FILM COATED ORAL at 22:15

## 2023-07-19 RX ADMIN — Medication 1 TABLET: at 08:03

## 2023-07-19 NOTE — PLAN OF CARE
Problem: Adult Behavioral Health Plan of Care  Goal: Plan of Care Review  Outcome: Ongoing, Progressing  Flowsheets  Taken 7/19/2023 1022  Progress: improving  Plan of Care Reviewed With: patient  Patient Agreement with Plan of Care: agrees  Outcome Evaluation: Pt calm and cooperative.HR is elevated today.  Taken 7/19/2023 0804  Plan of Care Reviewed With: patient  Patient Agreement with Plan of Care: agrees   Goal Outcome Evaluation:  Plan of Care Reviewed With: patient  Patient Agreement with Plan of Care: agrees     Progress: improving  Outcome Evaluation: Pt calm and cooperative.HR is elevated today.

## 2023-07-19 NOTE — PROGRESS NOTES
"INPATIENT PSYCHIATRIC PROGRESS NOTE    Name:  Aniceto Toledo  :  1985  MRN:  [id number]  Visit Number:  [id number]  Length of stay:  2    SUBJECTIVE    CC/Focus of Exam: alcohol and opioid use    INTERVAL HISTORY:  The patient reports he is feeling rough today and is experiencing aches, fatigue, headaches.   Depression rating 1/10  Anxiety rating /10  Sleep: fair  Withdrawal sx:fatigue, restlessness, tremors, feeling hot and cold, sweats.   Cravin/10    Review of Systems   Constitutional:  Positive for chills, diaphoresis and fatigue.   Respiratory: Negative.     Musculoskeletal:  Positive for myalgias.   Neurological:  Positive for tremors and weakness.   Psychiatric/Behavioral:  The patient is nervous/anxious.      OBJECTIVE    Temp:  [96 °F (35.6 °C)-99.3 °F (37.4 °C)] 96.7 °F (35.9 °C)  Heart Rate:  [] 103  Resp:  [17-20] 18  BP: ()/(54-91) 125/84    MENTAL STATUS EXAM:  Appearance:Casually dressed, good hygeine.   Cooperation:Cooperative  Psychomotor: No psychomotor agitation/retardation, No EPS, No motor tics  Speech-normal rate, amount.  Mood \"anxious at times\"   Affect-congruent, appropriate, stable  Thought Content-goal directed, no delusional material present  Thought process-linear, organized.  Suicidality: No SI  Homicidality: No HI  Perception: No AH/VH  Insight-fair   Judgement-fair    Lab Results (last 24 hours)       ** No results found for the last 24 hours. **               Imaging Results (Last 24 Hours)       ** No results found for the last 24 hours. **               ECG/EMG Results (most recent)       Procedure Component Value Units Date/Time    ECG 12 Lead Other [[id number]] Collected: 23     Updated: 23     QT Interval 376 ms      QTC Interval 487 ms     Narrative:      Test Reason : Other~  Blood Pressure :   */*   mmHG  Vent. Rate : 101 BPM     Atrial Rate : 101 BPM     P-R Int : 148 ms          QRS Dur :  86 ms      QT Int : 376 ms       " P-R-T Axes :  82  94  80 degrees     QTc Int : 487 ms    Sinus tachycardia  Possible Left atrial enlargement  Rightward axis  Borderline ECG  No previous ECGs available    Referred By:            Confirmed By:              ALLERGIES: Patient has no known allergies.    Medication Review:   Scheduled Medications:  amoxicillin-clavulanate, 1 tablet, Oral, Q12H  B-complex with vitamin C, 2 tablet, Oral, Daily  LORazepam, 1.5 mg, Oral, 3 times per day   Followed by  [START ON 2023] LORazepam, 1 mg, Oral, 3 times per day   Followed by  [START ON 2023] LORazepam, 0.5 mg, Oral, 3 times per day  multivitamin with minerals, 1 tablet, Oral, Daily  nicotine, 1 patch, Transdermal, Q24H  thiamine, 100 mg, Oral, Daily         PRN Medications:    aluminum-magnesium hydroxide-simethicone    benzonatate    benztropine **OR** benztropine    [] cloNIDine **FOLLOWED BY** cloNIDine **FOLLOWED BY** [START ON 2023] cloNIDine **FOLLOWED BY** [START ON 2023] cloNIDine    cyclobenzaprine    dicyclomine    famotidine    hydrALAZINE    ibuprofen    loperamide    [] LORazepam **FOLLOWED BY** LORazepam **FOLLOWED BY** [START ON 2023] LORazepam **FOLLOWED BY** [START ON 2023] LORazepam    magnesium hydroxide    sodium chloride   All medications reviewed.    ASSESSMENT & PLAN:      Alcohol use disorder, severe, dependence    Alcohol withdrawal  -Ativan detox  -Thiamine and folate       Opioid use disorder, severe, dependence    Opioid withdrawal  -Clonidine detox       Methamphetamine use disorder, severe  -Supportive treatment       Oral infection  -Augmentin       UTI  -UA positive for nitrites. Augmentin will help.     Special precautions: Special Precautions Level 4 (q30 min checks).    Behavioral Health Treatment Plan and Problem List: I have reviewed and approved the Behavioral Health Treatment Plan and Problem list.  The patient has had a chance to review and agrees with the treatment  plan.    Copied text in portions of this note has been reviewed and is accurate as of 07/19/23         Clinician:  Camron Beckwith MD  07/19/23  09:23 EDT

## 2023-07-19 NOTE — PLAN OF CARE
Goal Outcome Evaluation:        Problem: Adult Behavioral Health Plan of Care  Goal: Patient-Specific Goal (Individualization)  Outcome: Ongoing, Progressing  Flowsheets  Taken 7/18/2023 1408 by Geraldine Webster CSW  Patient-Specific Goals (Include Timeframe): Identify 2-3 coping skills, address relapse prevention methods, complete aftercare plans, and deny SI/HI prior to discharge.  Individualized Care Needs: Therapist to offer 1-4 therapy sessions, aftercare planning, safety planning, family education, group therapy, and brief CBT/MI interventions.  Taken 7/18/2023 1043 by Geraldine Webster CSW  Patient Personal Strengths:   resilient   resourceful   motivated for treatment   motivated for recovery   expressive of needs   expressive of emotions   parenting skills   stable living environment   positive educational history   relationship stability   family/social support   independent living skills   self-awareness  Patient Vulnerabilities:   occupational insecurity   history of unsuccessful treatment   poor impulse control   substance abuse/addiction   adverse childhood experience(s)  Taken 7/17/2023 2140 by Diane Salazar RN  Anxieties, Fears or Concerns: None verbalized  Goal: Optimized Coping Skills in Response to Life Stressors  Outcome: Ongoing, Progressing  Flowsheets (Taken 7/18/2023 1408)  Optimized Coping Skills in Response to Life Stressors: making progress toward outcome  Intervention: Promote Effective Coping Strategies  Flowsheets (Taken 7/19/2023 1121)  Supportive Measures:   active listening utilized   counseling provided   decision-making supported   goal-setting facilitated   verbalization of feelings encouraged   self-responsibility promoted   self-reflection promoted   positive reinforcement provided   self-care encouraged  Goal: Develops/Participates in Therapeutic [place] to Support Successful Transition  Outcome: Ongoing, Progressing  Flowsheets (Taken 7/18/2023  1400)  Develops/Participates in Therapeutic [place] to Support Successful Transition: making progress toward outcome  Intervention: Foster Therapeutic [place]  Flowsheets (Taken 7/19/2023 1121)  Trust Relationship/Rapport:   care explained   questions encouraged   choices provided   reassurance provided   emotional support provided   thoughts/feelings acknowledged   empathic listening provided   questions answered  Intervention: Mutually Develop Transition Plan  Flowsheets  Taken 7/19/2023 1120  Discharge Coordination/Progress: Patient has [employer]. Patient interested in residential rehab at discharge.  Transportation Anticipated: agency  Transportation Concerns: none  Current Discharge Risk: substance use/abuse  Concerns to be Addressed:   substance/tobacco abuse/use   coping/stress   mental health   discharge planning   cognitive/perceptual  Readmission Within the Last 30 Days: no previous admission in last 30 days  Patient/Family Anticipated Services at Transition: rehabilitation services  Patient's Choice of Community Agency(s): To be determined  Patient/Family Anticipates Transition to: inpatient rehabilitation facility  Offered/Gave Vendor List: no  Taken 7/18/2023 1401  Outpatient/Agency/Support Group Needs: residential services  Transition Support:   follow-up care discussed   community resources reviewed   crisis management plan promoted   crisis management plan verbalized   follow-up care coordinated  Anticipated Discharge Disposition: residential substance use unit         DATA:  Therapist met with Patient individually this date. Patient agreeable to discuss current treatment progress and discharge concerns.     CLINICAL MANUVERING/INTERVENTIONS:  Assisted Patient in processing session content; acknowledged and normalized Patient’s thoughts, feelings, and concerns by utilizing a person-centered approach in efforts to build appropriate rapport and a positive therapeutic relationship with open and  honest communication. Allowed Patient to ventilate regarding current stressors and triggers for negative emotions and thoughts in a safe nonjudgmental environment with unconditional positive regard, active listening skills, and empathy. Therapist implemented motivational interviewing techniques to assist Patient with exploring personal growth and change and discussed distress tolerance skills, self soothing techniques, and applied cognitive behavioral strategies to facilitate identification of maladaptive patterns of thinking and behavior.Therapist utilized dialectical behavior techniques to teach and model emotional regulation and relaxation methods. Therapist assisted Patient with identifying and implementing healthier coping strategies. Therapist assisted Patient with safety planning; Patient agreed to continue honest communication with Treatment Team while inpatient and identify any SI/HI.  Patient encouraged to seek nearest ER or contact 911 if danger to self or others post discharge.     ASSESSMENT:    Therapist attempted to meet with patient on this date, patient continues to receive treatment for detox. Patient partially opened eyes but kept falling asleep before answering most questions. Patient has reported minimal anxiety and depression, denied SI/HI/AVH. Patient has been experiencing fatigue, restlessness, tremors, hot/cold flashes, sweats, and some confusion at times. Patient was brought in by a residential rehab, treatment team continuing to assess.     PLAN:   Patient will continue stabilization. Patient will continue to receive services offered by Treatment Team.     Therapist recommends [facility] residential rehab.

## 2023-07-19 NOTE — PLAN OF CARE
Goal Outcome Evaluation:  Plan of Care Reviewed With: patient  Patient Agreement with Plan of Care: agrees     Progress: improving

## 2023-07-20 PROCEDURE — 99232 SBSQ HOSP IP/OBS MODERATE 35: CPT | Performed by: PSYCHIATRY & NEUROLOGY

## 2023-07-20 RX ADMIN — IBUPROFEN 400 MG: 400 TABLET, FILM COATED ORAL at 14:11

## 2023-07-20 RX ADMIN — Medication 1 TABLET: at 08:55

## 2023-07-20 RX ADMIN — LORAZEPAM 1 MG: 1 TABLET ORAL at 08:55

## 2023-07-20 RX ADMIN — Medication 2 TABLET: at 08:55

## 2023-07-20 RX ADMIN — LORAZEPAM 1 MG: 1 TABLET ORAL at 14:11

## 2023-07-20 RX ADMIN — LORAZEPAM 1 MG: 1 TABLET ORAL at 21:48

## 2023-07-20 RX ADMIN — Medication 100 MG: at 08:55

## 2023-07-20 RX ADMIN — AMOXICILLIN AND CLAVULANATE POTASSIUM 1 TABLET: 875; 125 TABLET, FILM COATED ORAL at 21:48

## 2023-07-20 RX ADMIN — AMOXICILLIN AND CLAVULANATE POTASSIUM 1 TABLET: 875; 125 TABLET, FILM COATED ORAL at 08:55

## 2023-07-20 NOTE — PROGRESS NOTES
"INPATIENT PSYCHIATRIC PROGRESS NOTE    Name:  Aniceto Toledo  :  1985  MRN:  [id number]  Visit Number:  [id number]  Length of stay:  3    SUBJECTIVE    CC/Focus of Exam: alcohol and opioid use    INTERVAL HISTORY:  The patient reports he is feeling tired and his tongue is bothering him. States bit on his tongue but doesn't recall how it happened. Was found in the yard unresponsive, and he has a history of seizures and may have had an alcohol withdrawal seizure.   Depression rating 2/10  Anxiety rating 6/10  Sleep: fair  Withdrawal sx:fatigue, restlessness, tremors, feeling hot and cold, sweats.   Cravin/10    Review of Systems   Constitutional:  Positive for chills, diaphoresis and fatigue.   Respiratory: Negative.     Musculoskeletal:  Positive for myalgias.   Neurological:  Positive for tremors and weakness.   Psychiatric/Behavioral:  The patient is nervous/anxious.      OBJECTIVE    Temp:  [96.3 °F (35.7 °C)-99 °F (37.2 °C)] 96.3 °F (35.7 °C)  Heart Rate:  [] 102  Resp:  [16-20] 20  BP: (118-149)/(68-80) 149/80    MENTAL STATUS EXAM:  Appearance:Casually dressed, good hygeine.   Cooperation:Cooperative  Psychomotor: No psychomotor agitation/retardation, No EPS, No motor tics  Speech-normal rate, amount.  Mood \"anxious at times\"   Affect-congruent, appropriate, stable  Thought Content-goal directed, no delusional material present  Thought process-linear, organized.  Suicidality: No SI  Homicidality: No HI  Perception: No AH/VH  Insight-fair   Judgement-fair    Lab Results (last 24 hours)       ** No results found for the last 24 hours. **               Imaging Results (Last 24 Hours)       ** No results found for the last 24 hours. **               ECG/EMG Results (most recent)       Procedure Component Value Units Date/Time    ECG 12 Lead Other [[id number]] Collected: 23     Updated: 23 1009     QT Interval 376 ms      QTC Interval 487 ms     Narrative:      Test Reason : " Other~  Blood Pressure :   */*   mmHG  Vent. Rate : 101 BPM     Atrial Rate : 101 BPM     P-R Int : 148 ms          QRS Dur :  86 ms      QT Int : 376 ms       P-R-T Axes :  82  94  80 degrees     QTc Int : 487 ms    Sinus tachycardia  Possible Left atrial enlargement  Rightward axis  Borderline ECG  No previous ECGs available  Confirmed by Mark Catalan ([id number]) on 2023 10:08:19 AM    Referred By:            Confirmed By: Mark Catalan             ALLERGIES: Patient has no known allergies.    Medication Review:   Scheduled Medications:  amoxicillin-clavulanate, 1 tablet, Oral, Q12H  B-complex with vitamin C, 2 tablet, Oral, Daily  LORazepam, 1 mg, Oral, 3 times per day   Followed by  [START ON 2023] LORazepam, 0.5 mg, Oral, 3 times per day  multivitamin with minerals, 1 tablet, Oral, Daily  nicotine, 1 patch, Transdermal, Q24H  thiamine, 100 mg, Oral, Daily         PRN Medications:    aluminum-magnesium hydroxide-simethicone    benzonatate    benztropine **OR** benztropine    [] cloNIDine **FOLLOWED BY** [] cloNIDine **FOLLOWED BY** cloNIDine **FOLLOWED BY** [START ON 2023] cloNIDine    cyclobenzaprine    dicyclomine    famotidine    hydrALAZINE    ibuprofen    loperamide    [] LORazepam **FOLLOWED BY** [] LORazepam **FOLLOWED BY** LORazepam **FOLLOWED BY** [START ON 2023] LORazepam    magnesium hydroxide    sodium chloride   All medications reviewed.    ASSESSMENT & PLAN:      Alcohol use disorder, severe, dependence    Alcohol withdrawal  -Continue Ativan detox  -Thiamine and folate       Opioid use disorder, severe, dependence    Opioid withdrawal  -Clonidine detox       Methamphetamine use disorder, severe  -Supportive treatment       Oral infection  -Augmentin       UTI  -UA positive for nitrites. Augmentin will help.     Special precautions: Special Precautions Level 4 (q30 min checks).    Behavioral Health Treatment Plan and Problem List: I have reviewed and  approved the Behavioral Health Treatment Plan and Problem list.  The patient has had a chance to review and agrees with the treatment plan.    Copied text in portions of this note has been reviewed and is accurate as of 07/20/23         Clinician:  Cmaron Beckwith MD  07/20/23  10:54 EDT

## 2023-07-20 NOTE — PLAN OF CARE
Goal Outcome Evaluation:  Plan of Care Reviewed With: patient  Patient Agreement with Plan of Care: agrees     Progress: improving       Pt slept well, appetite is poor for shift, and refused group. Pt given Clonidine PAS dose due to COWS score .

## 2023-07-20 NOTE — PLAN OF CARE
Chief Complaint   Patient presents with   • Follow-up     Cardiac management. Denies chest pain, shortness of breath and palpitations..Last labs done 10/10/18 in chart.   • Med Refill     Needs refills on all cardiac meds. Is not taking Aspirin as he should       Subjective       Myron Lackey is a 50 y.o. male  seen in December 2017 for initial cardiac evaluation. He has a history of hypertension,hyperlipidemia, IHD and cerebral vasculitis secondary to systemic lupus which was diagnosed in 2013 when he started having altered mental status. He was diagnosed with cerebritis at  and was then sent to [facility] in [facility].  He was put on Cytoxan for one year, but now takes CellCept.  He is followed regularly at . He was referred here for CP. Stress test showed inferior wall changes. Cath on 1/29/18 showed 95% stenosis of left PDA which was stented along with PTCA of left circumflex. March 2018 lab report showed , triglycerides 242, HDL 35 and LDL 78. TSH 2.74, improved LFT.   He had an episode of severe, crushing chest pain during strenuous activity and was evaluated on 10/3/18 with cardiac work up recommended. Lexiscan was done secondary to knee injury which showed inferior infarct with ranjeet-infarct ischemia. Cath was scheduled but he developed more symptoms prior to cath date and presented to the ER, was admitted, and underwent cath showing patent stent and 65% isolated RCA lesion with acceptable FFR of .85. He was managed medically.  10/10/2018 lab report showed slight improvement of lipids with total cholesterol being 153, triglycerides 213, HDL 33, and LDL 77.    Today he comes to the office for a follow-up visit.  No cardiac complaints are voiced at this time.  He is maintaining his normal daily activity which does not include strenuous physical exertion.  He can walk at a normal pace without problem.  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       equipment.    HPI     Cardiac History:    Past Surgical History:   Procedure Laterality Date   • CARDIAC CATHETERIZATION  01/29/2018    95% (L) PDA- 2.5x22 Reolute. PTCA of LCX   • CARDIAC CATHETERIZATION      Patent stent, 65% RCA with FFR .85. medcial mgmt   • CARDIOVASCULAR STRESS TEST  12/12/2017    L. Myoview- R/O Inferior Ischemia   • CARDIOVASCULAR STRESS TEST  10/10/2018    L. Cardiolite- EF 52%. Inferior Infarct with Periinfarct Ischemia.   • ECHO - CONVERTED  12/12/2017    EF 65%       Current Outpatient Medications   Medication Sig Dispense Refill   • ALPRAZolam (XANAX) 1 MG tablet Take 1 mg by mouth 3 (Three) Times a Day As Needed for Anxiety     • amLODIPine (NORVASC) 10 MG tablet Take 1 tablet by mouth Daily. 90 tablet 3   • atorvastatin (LIPITOR) 10 MG tablet Take 1 tablet by mouth Daily. 90 tablet 3   • fenofibrate micronized (LOFIBRA) 67 MG capsule Take 1 capsule by mouth Every Morning Before Breakfast. 90 capsule 3   • hydroxychloroquine (PLAQUENIL) 200 MG tablet Take 200 mg by mouth 2 (Two) Times a Day     • ibuprofen (ADVIL,MOTRIN) 800 MG tablet Take 800 mg by mouth Every 6 (Six) Hours As Needed for Mild Pain .     • losartan-hydrochlorothiazide (HYZAAR) 50-12.5 MG per tablet Take 1 tablet by mouth Daily. 90 tablet 3   • mycophenolate (CELLCEPT) 500 MG tablet 6 tablets twice daily     • OXcarbazepine (TRILEPTAL) 300 MG tablet Take 300 mg by mouth 3 (Three) Times a Day.     • ranolazine (RANEXA) 500 MG 12 hr tablet Take 1 tablet by mouth Every 12 (Twelve) Hours. 180 tablet 3   • ticagrelor (BRILINTA) 90 MG tablet tablet Take 1 tablet by mouth 2 (Two) Times a Day. 180 tablet 3   • aspirin 81 MG EC tablet Take 81 mg by mouth Daily.       No current facility-administered medications for this visit.        Imdur [isosorbide nitrate] and Lisinopril    Past Medical History:   Diagnosis Date   • Cerebral vasculitis    • History of surgery on arm     both arms   • Hyperlipidemia    • Hypertension    •  Stroke (CMS/Formerly [facility]) 2014   • Systemic lupus (CMS/Formerly [facility])        Social History     Socioeconomic History   • Marital status:      Spouse name: Not on file   • Number of children: Not on file   • Years of education: Not on file   • Highest education level: Not on file   Tobacco Use   • Smoking status: Never Smoker   • Smokeless tobacco: Never Used   Substance and Sexual Activity   • Alcohol use: Yes     Comment: occasional   • Drug use: No       Family History   Problem Relation Age of Onset   • Hypertension Mother    • Hyperlipidemia Mother    • Lung disease Mother    • Heart attack Mother    • Heart disease Mother    • Peripheral vascular disease Mother    • Heart attack Father    • Heart disease Father    • Hypertension Father    • Hyperlipidemia Father    • Heart attack Maternal Uncle    • Heart disease Maternal Uncle    • Hypertension Maternal Uncle    • Hyperlipidemia Maternal Uncle    • Heart attack Paternal Uncle    • Heart disease Paternal Uncle    • Hypertension Paternal Uncle    • Hyperlipidemia Paternal Uncle    • Heart disease Maternal Grandmother    • Heart attack Maternal Grandmother    • Hypertension Maternal Grandmother    • Hyperlipidemia Maternal Grandmother    • Heart attack Maternal Grandfather    • Heart disease Maternal Grandfather    • Hypertension Maternal Grandfather    • Hyperlipidemia Maternal Grandfather    • Heart attack Paternal Grandmother    • Heart disease Paternal Grandmother    • Hypertension Paternal Grandmother    • Hyperlipidemia Paternal Grandmother    • Heart attack Paternal Grandfather    • Heart disease Paternal Grandfather    • Hypertension Paternal Grandfather    • Hyperlipidemia Paternal Grandfather        Review of Systems   Constitution: Negative for decreased appetite, diaphoresis and fever.   HENT: Negative for hoarse voice and nosebleeds.    Eyes: Positive for visual disturbance (not a new problem).   Cardiovascular: Negative for chest pain, leg swelling,  "near-syncope and palpitations.   Respiratory: Positive for shortness of breath (with exertion). Negative for sputum production.    Endocrine: Negative for polydipsia, polyphagia and polyuria.   Hematologic/Lymphatic: Negative for bleeding problem. Does not bruise/bleed easily.   Skin: Negative for flushing and itching.   Musculoskeletal: Positive for muscle weakness. Negative for falls.   Gastrointestinal: Negative for abdominal pain, dysphagia and melena.   Genitourinary: Negative for dysuria and hematuria.   Neurological: Positive for headaches and numbness.        Follows with neurology        Objective     /82 (BP Location: Left arm)   Pulse 81   Ht 167.6 cm (66\")   Wt 80.1 kg (176 lb 8 oz)   SpO2 98%   BMI 28.49 kg/m²     Physical Exam   Constitutional: He is oriented to person, place, and time. Vital signs are normal. He appears well-nourished. No distress.   HENT:   Head: Normocephalic.   Eyes: Conjunctivae are normal.   Neck: Normal range of motion. Neck supple. Carotid bruit is not present.   Cardiovascular: Normal rate, regular rhythm, S1 normal and S2 normal.   No murmur heard.  Pulmonary/Chest: Effort normal and breath sounds normal. He has no rales.   Abdominal: Soft. Bowel sounds are normal. There is no tenderness.   Musculoskeletal: Normal range of motion. He exhibits no edema.   Neurological: He is alert and oriented to person, place, and time.   Skin: Skin is warm.   Psychiatric: He has a normal mood and affect.        Procedures: none today        Assessment/Plan      Myron was seen today for follow-up and med refill.    Diagnoses and all orders for this visit:    IHD (ischemic heart disease)  -     ticagrelor (BRILINTA) 90 MG tablet tablet; Take 1 tablet by mouth 2 (Two) Times a Day.  -     ranolazine (RANEXA) 500 MG 12 hr tablet; Take 1 tablet by mouth Every 12 (Twelve) Hours.    Essential hypertension  -     CBC (No Diff); Future  -     Comprehensive Metabolic Panel; Future  -     " losartan-hydrochlorothiazide (HYZAAR) 50-12.5 MG per tablet; Take 1 tablet by mouth Daily.  -     amLODIPine (NORVASC) 10 MG tablet; Take 1 tablet by mouth Daily.    Hypercholesteremia  -     Lipid Panel; Future  -     atorvastatin (LIPITOR) 10 MG tablet; Take 1 tablet by mouth Daily.    Hypertriglyceridemia  -     fenofibrate micronized (LOFIBRA) 67 MG capsule; Take 1 capsule by mouth Every Morning Before Breakfast.    Murmur, cardiac      Myron presents today without cardiac concerns.  He asked if any cardiac medications could be discontinued if not necessary.  We discussed decreasing the dose of Brilinta to maintenance dose being 60 mg twice a day.  Since he is doing well with current dose he wants to continue 90 mg twice a day.  He will try decreasing the dose of Ranexa to once a day if no symptoms and tolerates well then he can try discontinuation.  If any symptoms recur he understands to resume twice a day.  His blood pressure, heart rate and heart rhythm are normal.  No other medication changes advised at this time.    For cholesterol management he is on low-dose statin therapy in the form of Lipitor 10 mg.  Continue same.  A lab order given to reassess lipid panel, blood count, and chemistry as noted above.  I have requested a copy to also be sent to you.    Patient's Body mass index is 28.49 kg/m². BMI is above normal parameters. Recommendations include: nutrition counseling.  Heart healthy diet encouraged.    He follows monthly in [place] with neurology.  From a cardiac standpoint he appears stable at this time.  No repeat testing ordered.  We will see him back in 6 months, but please call sooner for any cardiac concerns.            Electronically signed by CHON Newell,  June 14, 2019 2:31 PM

## 2023-07-20 NOTE — CONSULTS
Visited with Aniceto today for the first time. He has been sleeping every other time I have tried to visit with him. He wanted me to pray for him, so I did. I also encouraged him to find a support group and [facility] to help with his struggle. Will follow up as needed.

## 2023-07-21 PROCEDURE — 99232 SBSQ HOSP IP/OBS MODERATE 35: CPT | Performed by: PSYCHIATRY & NEUROLOGY

## 2023-07-21 RX ADMIN — CYCLOBENZAPRINE 10 MG: 10 TABLET, FILM COATED ORAL at 21:13

## 2023-07-21 RX ADMIN — IBUPROFEN 400 MG: 400 TABLET, FILM COATED ORAL at 08:44

## 2023-07-21 RX ADMIN — AMOXICILLIN AND CLAVULANATE POTASSIUM 1 TABLET: 875; 125 TABLET, FILM COATED ORAL at 08:44

## 2023-07-21 RX ADMIN — Medication 2 TABLET: at 08:44

## 2023-07-21 RX ADMIN — LORAZEPAM 0.5 MG: 0.5 TABLET ORAL at 08:44

## 2023-07-21 RX ADMIN — Medication 1 TABLET: at 08:44

## 2023-07-21 RX ADMIN — CLONIDINE HYDROCHLORIDE 0.1 MG: 0.1 TABLET ORAL at 21:13

## 2023-07-21 RX ADMIN — LORAZEPAM 0.5 MG: 0.5 TABLET ORAL at 21:13

## 2023-07-21 RX ADMIN — AMOXICILLIN AND CLAVULANATE POTASSIUM 1 TABLET: 875; 125 TABLET, FILM COATED ORAL at 21:11

## 2023-07-21 RX ADMIN — LORAZEPAM 0.5 MG: 0.5 TABLET ORAL at 14:49

## 2023-07-21 RX ADMIN — Medication 100 MG: at 08:44

## 2023-07-21 NOTE — PLAN OF CARE
Goal Outcome Evaluation:  Plan of Care Reviewed With: patient  Patient Agreement with Plan of Care: agrees     Progress: improving  Outcome Evaluation: Patient calm and cooperative. Rates anxiety 2/10. Depression 3/10. Withdrawal symptoms: sweats and leg cramps. Denies cravings, SI, HI, or Carlos. no other issues noted at this time. Will continue to monitor.

## 2023-07-21 NOTE — PLAN OF CARE
Goal Outcome Evaluation:        Problem: Adult Behavioral Health Plan of Care  Goal: Patient-Specific Goal (Individualization)  Outcome: Ongoing, Progressing  Flowsheets  Taken 7/18/2023 1408 by Geraldine Webster CSW  Patient-Specific Goals (Include Timeframe): Identify 2-3 coping skills, address relapse prevention methods, complete aftercare plans, and deny SI/HI prior to discharge.  Individualized Care Needs: Therapist to offer 1-4 therapy sessions, aftercare planning, safety planning, family education, group therapy, and brief CBT/MI interventions.  Taken 7/18/2023 1043 by Geraldine Webster CSW  Patient Personal Strengths:   resilient   resourceful   motivated for treatment   motivated for recovery   expressive of needs   expressive of emotions   parenting skills   stable living environment   positive educational history   relationship stability   family/social support   independent living skills   self-awareness  Patient Vulnerabilities:   occupational insecurity   history of unsuccessful treatment   poor impulse control   substance abuse/addiction   adverse childhood experience(s)  Taken 7/17/2023 2140 by Diane Salazar RN  Anxieties, Fears or Concerns: None verbalized  Goal: Optimized Coping Skills in Response to Life Stressors  Outcome: Ongoing, Progressing  Flowsheets (Taken 7/18/2023 1408)  Optimized Coping Skills in Response to Life Stressors: making progress toward outcome  Intervention: Promote Effective Coping Strategies  Flowsheets (Taken 7/21/2023 1326)  Supportive Measures:   active listening utilized   counseling provided   decision-making supported   goal-setting facilitated   verbalization of feelings encouraged   self-responsibility promoted   positive reinforcement provided   self-reflection promoted   self-care encouraged  Goal: Develops/Participates in Therapeutic Oliver to Support Successful Transition  Outcome: Ongoing, Progressing  Flowsheets (Taken 7/18/2023  1408)  Develops/Participates in Therapeutic Cameron to Support Successful Transition: making progress toward outcome  Intervention: Foster Therapeutic Cameron  Flowsheets (Taken 7/21/2023 0855 by Yoli De La Torre RN)  Trust Relationship/Rapport:   care explained   choices provided   emotional support provided   empathic listening provided   questions answered   questions encouraged   reassurance provided   thoughts/feelings acknowledged  Intervention: Mutually Develop Transition Plan  Flowsheets  Taken 7/21/2023 1324  Discharge Coordination/Progress: Patient has [employer] Medicaid. Patient plans to return home at discharge and is not agreeable to aftercare at this time, family to provide transportation.  Transportation Anticipated: family or friend will provide  Transportation Concerns: none  Current Discharge Risk: substance use/abuse  Concerns to be Addressed:   substance/tobacco abuse/use   coping/stress   mental health   discharge planning   cognitive/perceptual  Readmission Within the Last 30 Days: no previous admission in last 30 days  Patient/Family Anticipated Services at Transition:   outpatient care   mental health services  Patient's Choice of Community Agency(s): declines, plans to follow up with [facility]  Patient/Family Anticipates Transition to: home with family  Offered/Gave Vendor List: no  Taken 7/18/2023 1408  Outpatient/Agency/Support Group Needs: residential services  Transition Support:   follow-up care discussed   community resources reviewed   crisis management plan promoted   crisis management plan verbalized   follow-up care coordinated  Anticipated Discharge Disposition: residential substance use unit         DATA:  Therapist met with Patient individually this date. Patient agreeable to discuss current treatment progress and discharge concerns.     CLINICAL MANUVERING/INTERVENTIONS:  Assisted Patient in processing session content; acknowledged and normalized Patient’s  thoughts, feelings, and concerns by utilizing a person-centered approach in efforts to build appropriate rapport and a positive therapeutic relationship with open and honest communication. Allowed Patient to ventilate regarding current stressors and triggers for negative emotions and thoughts in a safe nonjudgmental environment with unconditional positive regard, active listening skills, and empathy. Therapist implemented motivational interviewing techniques to assist Patient with exploring personal growth and change and discussed distress tolerance skills, self soothing techniques, and applied cognitive behavioral strategies to facilitate identification of maladaptive patterns of thinking and behavior.Therapist utilized dialectical behavior techniques to teach and model emotional regulation and relaxation methods. Therapist assisted Patient with identifying and implementing healthier coping strategies. Therapist assisted Patient with safety planning; Patient agreed to continue honest communication with Treatment Team while inpatient and identify any SI/HI.  Patient encouraged to seek nearest ER or contact 911 if danger to self or others post discharge.     ASSESSMENT:    Therapist met with patient on this date, patient continues to receive treatment for detox. Patient reports minimal anxiety and depression, denies current SI/HI/AVH. Patient reports improvement in withdrawal symptoms, continues to experience mild sweats and leg cramps. Patient plans to return home at discharge and stop by [facility] on his way home tomorrow, declines additional aftercare. Patient's family will provide transportation. Therapist has discussed healthy coping skills and relapse prevention with patient. Therapist has recommended patient not have access to firearms, weapons, or very sharp objects. Patient reports feeling safe to manage his own medications at home. Therapist has assisted patient in identifying risk factors  which would indicate the need for higher level of care including thoughts to harm self or others and/or self-harming behavior. Encouraged patient to call [phone] or present to the nearest emergency room should any of these events occur.     PLAN:   Patient will continue stabilization. Patient will continue to receive services offered by Treatment Team.     Therapist recommends [facility] residential rehab. Patient plans to return home, not agreeable to aftercare.

## 2023-07-21 NOTE — PROGRESS NOTES
"INPATIENT PSYCHIATRIC PROGRESS NOTE    Name:  Aniceto Toledo  :  1985  MRN:  [id number]  Visit Number:  [id number]  Length of stay:  4    SUBJECTIVE    CC/Focus of Exam: alcohol and opioid use    INTERVAL HISTORY:  The patient reports he is feeling better and mouth is also not bothering him as bad as previously.   Depression rating 10  Anxiety rating 1/10  Sleep: fair  Withdrawal sx: none  Cravin/10    Review of Systems   Respiratory: Negative.     Cardiovascular: Negative.    Gastrointestinal: Negative.      OBJECTIVE    Temp:  [97.1 °F (36.2 °C)-97.4 °F (36.3 °C)] 97.2 °F (36.2 °C)  Heart Rate:  [] 94  Resp:  [16-18] 18  BP: ()/(60-94) 138/94    MENTAL STATUS EXAM:  Appearance:Casually dressed, good hygeine.   Cooperation:Cooperative  Psychomotor: No psychomotor agitation/retardation, No EPS, No motor tics  Speech-normal rate, amount.  Mood \"better\"   Affect-congruent, appropriate, stable  Thought Content-goal directed, no delusional material present  Thought process-linear, organized.  Suicidality: No SI  Homicidality: No HI  Perception: No AH/VH  Insight-fair   Judgement-fair    Lab Results (last 24 hours)       ** No results found for the last 24 hours. **               Imaging Results (Last 24 Hours)       ** No results found for the last 24 hours. **               ECG/EMG Results (most recent)       Procedure Component Value Units Date/Time    ECG 12 Lead Other [[id number]] Collected: 23     Updated: 23 1009     QT Interval 376 ms      QTC Interval 487 ms     Narrative:      Test Reason : Other~  Blood Pressure :   */*   mmHG  Vent. Rate : 101 BPM     Atrial Rate : 101 BPM     P-R Int : 148 ms          QRS Dur :  86 ms      QT Int : 376 ms       P-R-T Axes :  82  94  80 degrees     QTc Int : 487 ms    Sinus tachycardia  Possible Left atrial enlargement  Rightward axis  Borderline ECG  No previous ECGs available  Confirmed by Mark Catalan ([id number]) on 2023 " 10:08:19 AM    Referred By:            Confirmed By: Mark Catalan             ALLERGIES: Patient has no known allergies.    Medication Review:   Scheduled Medications:  amoxicillin-clavulanate, 1 tablet, Oral, Q12H  B-complex with vitamin C, 2 tablet, Oral, Daily  LORazepam, 0.5 mg, Oral, 3 times per day  multivitamin with minerals, 1 tablet, Oral, Daily  nicotine, 1 patch, Transdermal, Q24H  thiamine, 100 mg, Oral, Daily         PRN Medications:    aluminum-magnesium hydroxide-simethicone    benzonatate    benztropine **OR** benztropine    [] cloNIDine **FOLLOWED BY** [] cloNIDine **FOLLOWED BY** [] cloNIDine **FOLLOWED BY** cloNIDine    cyclobenzaprine    dicyclomine    famotidine    hydrALAZINE    ibuprofen    loperamide    [] LORazepam **FOLLOWED BY** [] LORazepam **FOLLOWED BY** [] LORazepam **FOLLOWED BY** LORazepam    magnesium hydroxide    sodium chloride   All medications reviewed.    ASSESSMENT & PLAN:      Alcohol use disorder, severe, dependence    Alcohol withdrawal  -Continue Ativan detox  -Thiamine and folate  -Patient is on last day of detox. Continue to monitor and if does well may discharge tomorrow.        Opioid use disorder, severe, dependence    Opioid withdrawal  -Clonidine detox       Methamphetamine use disorder, severe  -Supportive treatment       Oral infection  -Augmentin       UTI  -UA positive for nitrites. Augmentin will help.     Special precautions: Special Precautions Level 4 (q30 min checks).    Behavioral Health Treatment Plan and Problem List: I have reviewed and approved the Behavioral Health Treatment Plan and Problem list.  The patient has had a chance to review and agrees with the treatment plan.    Copied text in portions of this note has been reviewed and is accurate as of 23         Clinician:  Camron Beckwith MD  23  11:56 EDT

## 2023-07-22 VITALS
SYSTOLIC BLOOD PRESSURE: 134 MMHG | RESPIRATION RATE: 16 BRPM | OXYGEN SATURATION: 100 % | TEMPERATURE: 97.1 F | BODY MASS INDEX: 22.06 KG/M2 | HEART RATE: 76 BPM | WEIGHT: 132.4 LBS | DIASTOLIC BLOOD PRESSURE: 79 MMHG | HEIGHT: 65 IN

## 2023-07-22 PROCEDURE — 99239 HOSP IP/OBS DSCHRG MGMT >30: CPT | Performed by: PSYCHIATRY & NEUROLOGY

## 2023-07-22 RX ORDER — AMOXICILLIN AND CLAVULANATE POTASSIUM 875; 125 MG/1; MG/1
1 TABLET, FILM COATED ORAL EVERY 12 HOURS SCHEDULED
Qty: 12 TABLET | Refills: 0 | Status: SHIPPED | OUTPATIENT
Start: 2023-07-22 | End: 2023-07-28

## 2023-07-22 NOTE — DISCHARGE SUMMARY
PSYCHIATRIC DISCHARGE SUMMARY     Patient Identification:  Name:  Aniceto Toledo  Age:  38 y.o.  Sex:  male  :  1985  MRN:  [id number]  Visit Number:  [id number]      Date of Admission:2023   Date of Discharge:  2023    Discharge Diagnosis:  Alcohol use disorder, severe, dependence  Opioid use disorder, severe, dependence  Methamphetamine use disorder, severe  Dental infection  UTI      Admission Diagnosis:  Substance abuse [F19.10]     Hospital Course  Patient is a 38 y.o. male presented with complaints of alcohol and opioid use disorder with withdrawal symptoms.      Patient was admitted for crisis stabilization and voluntary detox.  Patient was evaluated by psychiatrist on a daily basis and had the opportunity for both group and individual therapy by master's level therapist.  Routine laboratory studies and EKG were performed.    Patient was placed on an Ativan detox protocol with CIWA and supplemented with thiamine and folate for alcohol use and withdrawal.  He was placed on clonidine detox protocol for opioid use and withdrawal.  Supportive care was offered for methamphetamine use.  Patient was found to have a dental infection and Augmentin course was started and he will complete this after discharge.  UA was indicative of UTI which was also treated with the course of Augmentin.  Patient completed his detox course and by day of discharge had minimal withdrawal symptoms.  He did have a lot of anxiety about finishing hospitalization and returning home and I feel that he did embellish some withdrawal symptoms towards the end of his course due to anxiety and a gambit to try and stay in the hospital longer.  I spent time with patient discussing his symptoms.  He did endorse some minor visual hallucinations but did not seem to be bothered by them and was not responding to internal stimuli.  If patient was experiencing hallucinations I advised that this could be mild residual withdrawal  "symptoms that could occur to a lesser frequency and severity until resolution.  He did not have any concerning findings on examination and evaluation for withdrawal.  Cessation of all substances was encouraged and substance abuse treatment including inpatient rehab after hospitalization was endorsed, however patient was not amenable to this and preferred to return home.  On day of discharge, he denied SI/HI.    Mental Status Exam upon discharge:   Mood \"Anxious\"   Affect-congruent, appropriate, stable  Thought Content-goal directed, no delusional material present  Thought process-linear, organized.  Suicidality: No SI  Homicidality: No HI  Perception: No AH/VH  Insight and Judgement: fair    Procedures Performed         Consults:   Consults       No orders found from 6/18/2023 to 7/18/2023.            Pertinent Test Results:  Lab Results (last 72 hours)       ** No results found for the last 72 hours. **              ECG/EMG Results (most recent)       Procedure Component Value Units Date/Time    ECG 12 Lead Other [[id number]] Collected: 07/17/23 2225     Updated: 07/19/23 1009     QT Interval 376 ms      QTC Interval 487 ms     Narrative:      Test Reason : Other~  Blood Pressure :   */*   mmHG  Vent. Rate : 101 BPM     Atrial Rate : 101 BPM     P-R Int : 148 ms          QRS Dur :  86 ms      QT Int : 376 ms       P-R-T Axes :  82  94  80 degrees     QTc Int : 487 ms    Sinus tachycardia  Possible Left atrial enlargement  Rightward axis  Borderline ECG  No previous ECGs available  Confirmed by Mark Catalan ([id number]) on 7/19/2023 10:08:19 AM    Referred By:            Confirmed By: Mark Catalan             EKG: sinus tachycardia.    Condition on Discharge:  improved    Vital Signs  Temp:  [97.1 °F (36.2 °C)-98.2 °F (36.8 °C)] 97.1 °F (36.2 °C)  Heart Rate:  [68-80] 76  Resp:  [16-18] 16  BP: (127-149)/(79-86) 134/79    Discharge Disposition:  Home or Self Care    Discharge Medications:     Discharge Medications    "     New Medications        Instructions Start Date   amoxicillin-clavulanate 875-125 MG per tablet  Commonly known as: AUGMENTIN   1 tablet, Oral, Every 12 Hours Scheduled               Discharge Diet:   Diet Instructions    Advance as tolerated        Regular    Activity at Discharge:   Activity Instructions    As tolerated        As tolerated    Follow-up Appointments  No future appointments.      Test Results Pending at Discharge   None    I spent a total of 35 minutes face-to-face with the patient regarding discharge planning, evaluation, discussion, and follow-up plans.        Clinician:   Hunter De La Torre MD  07/22/23  15:32 EDT

## 2023-07-22 NOTE — PLAN OF CARE
Goal Outcome Evaluation:  Plan of Care Reviewed With: patient  Patient Agreement with Plan of Care: agrees     Progress: no change  Outcome Evaluation: Pt cooperative but extremely anxious and concerned about possible d/c. Pt denies w/d sx besides anxiety and rates anxiety/depression 10/10 and denies craving. Pt denies SI/HI/AVH.

## 2023-07-22 NOTE — PLAN OF CARE
Goal Outcome Evaluation:  Plan of Care Reviewed With: patient  Patient Agreement with Plan of Care: agrees     Progress: no change  Outcome Evaluation: Pt cooperative but extremely anxious and concerned about possible d/c. Pt denies w/d sx besides anxiety and rates anxiety/depression 10/10 and denies craving. Pt denies SI/HI/AVH.  Pt appeared to sleep throughout the night.

## 2023-07-23 NOTE — PAYOR COMM NOTE
"Aniceto Toledo (38 y.o. Male)  [id number]      Date of Birth   1985    Social Security Number       Address   [address] [place] [address]    Home Phone   [phone]    MRN   [id number]       Zoroastrian   None    Marital Status   Legally                             Admission Date   23    Admission Type   Emergency    Admitting Provider   Scottie Sampson MD    Attending Provider       Department, Room/Bed   [facility] ADULT CD, 1041/2S       Discharge Date   2023    Discharge Disposition   Home or Self Care    Discharge Destination   Home                              Attending Provider: (none)   Allergies: No Known Allergies    Isolation: None   Infection: None   Code Status: Prior    Ht: 165.1 cm (65\")   Wt: 60.1 kg (132 lb 6.4 oz)    Admission Cmt: None   Principal Problem: None                  Active Insurance as of 2023       Primary Coverage       Payor Plan Insurance Group Employer/Plan Group    HUMANA HUMANA [id number]       Payor Plan Address Payor Plan Phone Number Payor Plan Fax Number Effective Dates    [address] [address] [phone]  2021 - None Entered    [facility] [address]         Subscriber Name Subscriber Birth Date Member ID       ANICETO TOLEDO 1985 [id number]                     Emergency Contacts        (Rel.) Home Phone Work Phone Mobile Phone    amirah Rivera (Friend) [phone] -- --                 Discharge Summary        Hunter De La Torre MD at 23 1435                PSYCHIATRIC DISCHARGE SUMMARY     Patient Identification:  Name:  Aniceto Toledo  Age:  38 y.o.  Sex:  male  :  1985  MRN:  [id number]  Visit Number:  [id number]      Date of Admission:2023   Date of Discharge:  2023    Discharge Diagnosis:  Alcohol use disorder, severe, dependence  Opioid use disorder, severe, dependence  Methamphetamine use disorder, severe  Dental infection  UTI      Admission " Diagnosis:  Substance abuse [F19.10]     Hospital Course  Patient is a 38 y.o. male presented with complaints of alcohol and opioid use disorder with withdrawal symptoms.      Patient was admitted for crisis stabilization and voluntary detox.  Patient was evaluated by psychiatrist on a daily basis and had the opportunity for both group and individual therapy by master's level therapist.  Routine laboratory studies and EKG were performed.    Patient was placed on an Ativan detox protocol with CIWA and supplemented with thiamine and folate for alcohol use and withdrawal.  He was placed on clonidine detox protocol for opioid use and withdrawal.  Supportive care was offered for methamphetamine use.  Patient was found to have a dental infection and Augmentin course was started and he will complete this after discharge.  UA was indicative of UTI which was also treated with the course of Augmentin.  Patient completed his detox course and by day of discharge had minimal withdrawal symptoms.  He did have a lot of anxiety about finishing hospitalization and returning home and I feel that he did embellish some withdrawal symptoms towards the end of his course due to anxiety and a gambit to try and stay in the hospital longer.  I spent time with patient discussing his symptoms.  He did endorse some minor visual hallucinations but did not seem to be bothered by them and was not responding to internal stimuli.  If patient was experiencing hallucinations I advised that this could be mild residual withdrawal symptoms that could occur to a lesser frequency and severity until resolution.  He did not have any concerning findings on examination and evaluation for withdrawal.  Cessation of all substances was encouraged and substance abuse treatment including inpatient rehab after hospitalization was endorsed, however patient was not amenable to this and preferred to return home.  On day of discharge, he denied SI/HI.    Mental Status  "Exam upon discharge:   Mood \"Anxious\"   Affect-congruent, appropriate, stable  Thought Content-goal directed, no delusional material present  Thought process-linear, organized.  Suicidality: No SI  Homicidality: No HI  Perception: No AH/VH  Insight and Judgement: fair    Procedures Performed         Consults:   Consults       No orders found from 6/18/2023 to 7/18/2023.            Pertinent Test Results:  Lab Results (last 72 hours)       ** No results found for the last 72 hours. **              ECG/EMG Results (most recent)       Procedure Component Value Units Date/Time    ECG 12 Lead Other [[id number]] Collected: 07/17/23 2225     Updated: 07/19/23 1009     QT Interval 376 ms      QTC Interval 487 ms     Narrative:      Test Reason : Other~  Blood Pressure :   */*   mmHG  Vent. Rate : 101 BPM     Atrial Rate : 101 BPM     P-R Int : 148 ms          QRS Dur :  86 ms      QT Int : 376 ms       P-R-T Axes :  82  94  80 degrees     QTc Int : 487 ms    Sinus tachycardia  Possible Left atrial enlargement  Rightward axis  Borderline ECG  No previous ECGs available  Confirmed by Mark Catalan ([id number]) on 7/19/2023 10:08:19 AM    Referred By:            Confirmed By: Mark Catalan             EKG: sinus tachycardia.    Condition on Discharge:  improved    Vital Signs  Temp:  [97.1 °F (36.2 °C)-98.2 °F (36.8 °C)] 97.1 °F (36.2 °C)  Heart Rate:  [68-80] 76  Resp:  [16-18] 16  BP: (127-149)/(79-86) 134/79    Discharge Disposition:  Home or Self Care    Discharge Medications:     Discharge Medications        New Medications        Instructions Start Date   amoxicillin-clavulanate 875-125 MG per tablet  Commonly known as: AUGMENTIN   1 tablet, Oral, Every 12 Hours Scheduled               Discharge Diet:   Diet Instructions    Advance as tolerated        Regular    Activity at Discharge:   Activity Instructions    As tolerated        As tolerated    Follow-up Appointments  No future appointments.      Test Results Pending at " Discharge   None    I spent a total of 35 minutes face-to-face with the patient regarding discharge planning, evaluation, discussion, and follow-up plans.        Clinician:   Hunter De La Torre MD  07/22/23  15:32 EDT        Electronically signed by Hunter De La Torre MD at 07/22/23 1673

## 2023-07-24 NOTE — PAYOR COMM NOTE
"Aniceto Toledo (38 y.o. Male)       Date of Birth   1985    Social Security Number       Address   [address] [place] [address]    Home Phone   [phone]    MRN   [id number]       Jain   None    Marital Status   Legally                             Admission Date   7/17/23    Admission Type   Emergency    Admitting Provider   Scottie Sampson MD    Attending Provider       Department, Room/Bed   [facility] ADULT CD, 1041/2S       Discharge Date   7/22/2023    Discharge Disposition   Home or Self Care    Discharge Destination   Home                              Attending Provider: (none)   Allergies: No Known Allergies    Isolation: None   Infection: None   Code Status: Prior    Ht: 165.1 cm (65\")   Wt: 60.1 kg (132 lb 6.4 oz)    Admission Cmt: None   Principal Problem: None                  Active Insurance as of 7/17/2023       Primary Coverage       Payor Plan Insurance Group Employer/Plan Group    HUMANA HUMANA [id number]       Payor Plan Address Payor Plan Phone Number Payor Plan Fax Number Effective Dates    [address] [address] [phone]  1/1/2021 - None Entered    [facility] [address]         Subscriber Name Subscriber Birth Date Member ID       ANICETO TOLEDO 1985 [id number]                     Emergency Contacts        (Rel.) Home Phone Work Phone Mobile Phone    amirah Rivera (Friend) [phone] -- --          PLEASE ATTACH THIS DISCHARGE INFORMATION TO AUTH. # [id number]     DISCHARGE DATE:  07/22/2023    Discharge Diagnosis:  Alcohol use disorder, severe, dependence (F10.20)  Opioid use disorder, severe, dependence (F11.20)  Methamphetamine use disorder, severe (F15.20)  Dental infection  UTI    FOLLOW UP:  Patient declines, plans to follow up independently with [facility].             Discharge Summary        Hunter De La Torre MD at 07/22/23 1903                PSYCHIATRIC DISCHARGE SUMMARY     Patient " Identification:  Name:  Aniceto Toledo  Age:  38 y.o.  Sex:  male  :  1985  MRN:  [id number]  Visit Number:  [id number]      Date of Admission:2023   Date of Discharge:  2023    Discharge Diagnosis:  Alcohol use disorder, severe, dependence  Opioid use disorder, severe, dependence  Methamphetamine use disorder, severe  Dental infection  UTI      Admission Diagnosis:  Substance abuse [F19.10]     Hospital Course  Patient is a 38 y.o. male presented with complaints of alcohol and opioid use disorder with withdrawal symptoms.      Patient was admitted for crisis stabilization and voluntary detox.  Patient was evaluated by psychiatrist on a daily basis and had the opportunity for both group and individual therapy by master's level therapist.  Routine laboratory studies and EKG were performed.    Patient was placed on an Ativan detox protocol with CIWA and supplemented with thiamine and folate for alcohol use and withdrawal.  He was placed on clonidine detox protocol for opioid use and withdrawal.  Supportive care was offered for methamphetamine use.  Patient was found to have a dental infection and Augmentin course was started and he will complete this after discharge.  UA was indicative of UTI which was also treated with the course of Augmentin.  Patient completed his detox course and by day of discharge had minimal withdrawal symptoms.  He did have a lot of anxiety about finishing hospitalization and returning home and I feel that he did embellish some withdrawal symptoms towards the end of his course due to anxiety and a gambit to try and stay in the hospital longer.  I spent time with patient discussing his symptoms.  He did endorse some minor visual hallucinations but did not seem to be bothered by them and was not responding to internal stimuli.  If patient was experiencing hallucinations I advised that this could be mild residual withdrawal symptoms that could occur to a lesser frequency and  "severity until resolution.  He did not have any concerning findings on examination and evaluation for withdrawal.  Cessation of all substances was encouraged and substance abuse treatment including inpatient rehab after hospitalization was endorsed, however patient was not amenable to this and preferred to return home.  On day of discharge, he denied SI/HI.    Mental Status Exam upon discharge:   Mood \"Anxious\"   Affect-congruent, appropriate, stable  Thought Content-goal directed, no delusional material present  Thought process-linear, organized.  Suicidality: No SI  Homicidality: No HI  Perception: No AH/VH  Insight and Judgement: fair    Procedures Performed         Consults:   Consults       No orders found from 6/18/2023 to 7/18/2023.            Pertinent Test Results:  Lab Results (last 72 hours)       ** No results found for the last 72 hours. **              ECG/EMG Results (most recent)       Procedure Component Value Units Date/Time    ECG 12 Lead Other [[id number]] Collected: 07/17/23 2225     Updated: 07/19/23 1009     QT Interval 376 ms      QTC Interval 487 ms     Narrative:      Test Reason : Other~  Blood Pressure :   */*   mmHG  Vent. Rate : 101 BPM     Atrial Rate : 101 BPM     P-R Int : 148 ms          QRS Dur :  86 ms      QT Int : 376 ms       P-R-T Axes :  82  94  80 degrees     QTc Int : 487 ms    Sinus tachycardia  Possible Left atrial enlargement  Rightward axis  Borderline ECG  No previous ECGs available  Confirmed by Mark Catalan ([id number]) on 7/19/2023 10:08:19 AM    Referred By:            Confirmed By: Mark Catalan             EKG: sinus tachycardia.    Condition on Discharge:  improved    Vital Signs  Temp:  [97.1 °F (36.2 °C)-98.2 °F (36.8 °C)] 97.1 °F (36.2 °C)  Heart Rate:  [68-80] 76  Resp:  [16-18] 16  BP: (127-149)/(79-86) 134/79    Discharge Disposition:  Home or Self Care    Discharge Medications:     Discharge Medications        New Medications        Instructions Start Date "   amoxicillin-clavulanate 875-125 MG per tablet  Commonly known as: AUGMENTIN   1 tablet, Oral, Every 12 Hours Scheduled               Discharge Diet:   Diet Instructions    Advance as tolerated        Regular    Activity at Discharge:   Activity Instructions    As tolerated        As tolerated    Follow-up Appointments  No future appointments.      Test Results Pending at Discharge   None    I spent a total of 35 minutes face-to-face with the patient regarding discharge planning, evaluation, discussion, and follow-up plans.        Clinician:   Hunter De La Torre MD  07/22/23  15:32 EDT        Electronically signed by Hunter De La Torre MD at 07/22/23 0733

## 2023-12-17 ENCOUNTER — HOSPITAL ENCOUNTER (EMERGENCY)
Facility: HOSPITAL | Age: 38
Discharge: PSYCHIATRIC OR SUBSTANCE USE FACILITY | DRG: 897 | End: 2023-12-17
Attending: STUDENT IN AN ORGANIZED HEALTH CARE EDUCATION/TRAINING PROGRAM | Admitting: STUDENT IN AN ORGANIZED HEALTH CARE EDUCATION/TRAINING PROGRAM
Payer: COMMERCIAL

## 2023-12-17 ENCOUNTER — HOSPITAL ENCOUNTER (INPATIENT)
Facility: HOSPITAL | Age: 38
LOS: 5 days | Discharge: HOME | DRG: 897 | End: 2023-12-22
Attending: PSYCHIATRY & NEUROLOGY | Admitting: PSYCHIATRY & NEUROLOGY
Payer: COMMERCIAL

## 2023-12-17 VITALS
TEMPERATURE: 97.3 F | SYSTOLIC BLOOD PRESSURE: 123 MMHG | DIASTOLIC BLOOD PRESSURE: 74 MMHG | WEIGHT: 138 LBS | HEIGHT: 66 IN | RESPIRATION RATE: 18 BRPM | BODY MASS INDEX: 22.18 KG/M2 | HEART RATE: 97 BPM | OXYGEN SATURATION: 97 %

## 2023-12-17 DIAGNOSIS — F19.10 POLYSUBSTANCE ABUSE: Primary | ICD-10-CM

## 2023-12-17 DIAGNOSIS — F19.10 POLYSUBSTANCE ABUSE: ICD-10-CM

## 2023-12-17 DIAGNOSIS — F10.230 ALCOHOL DEPENDENCE WITH UNCOMPLICATED WITHDRAWAL: Primary | ICD-10-CM

## 2023-12-17 LAB
ALBUMIN SERPL-MCNC: 4.8 G/DL (ref 3.5–5.2)
ALBUMIN/GLOB SERPL: 1.5 G/DL
ALP SERPL-CCNC: 101 U/L (ref 39–117)
ALT SERPL W P-5'-P-CCNC: 43 U/L (ref 1–41)
AMPHET+METHAMPHET UR QL: POSITIVE
AMPHETAMINES UR QL: POSITIVE
ANION GAP SERPL CALCULATED.3IONS-SCNC: 17.7 MMOL/L (ref 5–15)
AST SERPL-CCNC: 51 U/L (ref 1–40)
BARBITURATES UR QL SCN: NEGATIVE
BASOPHILS # BLD AUTO: 0.06 10*3/MM3 (ref 0–0.2)
BASOPHILS NFR BLD AUTO: 0.6 % (ref 0–1.5)
BENZODIAZ UR QL SCN: NEGATIVE
BILIRUB SERPL-MCNC: 0.8 MG/DL (ref 0–1.2)
BILIRUB UR QL STRIP: NEGATIVE
BUN SERPL-MCNC: 23 MG/DL (ref 6–20)
BUN/CREAT SERPL: 18.5 (ref 7–25)
BUPRENORPHINE SERPL-MCNC: POSITIVE NG/ML
CALCIUM SPEC-SCNC: 9.5 MG/DL (ref 8.6–10.5)
CANNABINOIDS SERPL QL: NEGATIVE
CHLORIDE SERPL-SCNC: 99 MMOL/L (ref 98–107)
CLARITY UR: CLEAR
CO2 SERPL-SCNC: 21.3 MMOL/L (ref 22–29)
COCAINE UR QL: NEGATIVE
COLOR UR: YELLOW
CREAT SERPL-MCNC: 1.24 MG/DL (ref 0.76–1.27)
DEPRECATED RDW RBC AUTO: 49.6 FL (ref 37–54)
EGFRCR SERPLBLD CKD-EPI 2021: 77.3 ML/MIN/1.73
EOSINOPHIL # BLD AUTO: 0.18 10*3/MM3 (ref 0–0.4)
EOSINOPHIL NFR BLD AUTO: 1.9 % (ref 0.3–6.2)
ERYTHROCYTE [DISTWIDTH] IN BLOOD BY AUTOMATED COUNT: 13.2 % (ref 12.3–15.4)
ETHANOL BLD-MCNC: 64 MG/DL (ref 0–10)
ETHANOL UR QL: 0.06 %
FENTANYL UR-MCNC: NEGATIVE NG/ML
GLOBULIN UR ELPH-MCNC: 3.1 GM/DL
GLUCOSE SERPL-MCNC: 171 MG/DL (ref 65–99)
GLUCOSE UR STRIP-MCNC: NEGATIVE MG/DL
HCT VFR BLD AUTO: 40.3 % (ref 37.5–51)
HGB BLD-MCNC: 13.7 G/DL (ref 13–17.7)
HGB UR QL STRIP.AUTO: NEGATIVE
IMM GRANULOCYTES # BLD AUTO: 0.02 10*3/MM3 (ref 0–0.05)
IMM GRANULOCYTES NFR BLD AUTO: 0.2 % (ref 0–0.5)
KETONES UR QL STRIP: NEGATIVE
LEUKOCYTE ESTERASE UR QL STRIP.AUTO: NEGATIVE
LYMPHOCYTES # BLD AUTO: 2.06 10*3/MM3 (ref 0.7–3.1)
LYMPHOCYTES NFR BLD AUTO: 21.3 % (ref 19.6–45.3)
MAGNESIUM SERPL-MCNC: 2.2 MG/DL (ref 1.6–2.6)
MCH RBC QN AUTO: 34.2 PG (ref 26.6–33)
MCHC RBC AUTO-ENTMCNC: 34 G/DL (ref 31.5–35.7)
MCV RBC AUTO: 100.5 FL (ref 79–97)
METHADONE UR QL SCN: NEGATIVE
MONOCYTES # BLD AUTO: 0.5 10*3/MM3 (ref 0.1–0.9)
MONOCYTES NFR BLD AUTO: 5.2 % (ref 5–12)
NEUTROPHILS NFR BLD AUTO: 6.83 10*3/MM3 (ref 1.7–7)
NEUTROPHILS NFR BLD AUTO: 70.8 % (ref 42.7–76)
NITRITE UR QL STRIP: NEGATIVE
NRBC BLD AUTO-RTO: 0 /100 WBC (ref 0–0.2)
OPIATES UR QL: NEGATIVE
OXYCODONE UR QL SCN: NEGATIVE
PCP UR QL SCN: NEGATIVE
PH UR STRIP.AUTO: 5.5 [PH] (ref 5–8)
PLATELET # BLD AUTO: 229 10*3/MM3 (ref 140–450)
PMV BLD AUTO: 9 FL (ref 6–12)
POTASSIUM SERPL-SCNC: 3.7 MMOL/L (ref 3.5–5.2)
PROT SERPL-MCNC: 7.9 G/DL (ref 6–8.5)
PROT UR QL STRIP: NEGATIVE
RBC # BLD AUTO: 4.01 10*6/MM3 (ref 4.14–5.8)
SODIUM SERPL-SCNC: 138 MMOL/L (ref 136–145)
SP GR UR STRIP: 1.01 (ref 1–1.03)
TRICYCLICS UR QL SCN: NEGATIVE
UROBILINOGEN UR QL STRIP: NORMAL
WBC NRBC COR # BLD AUTO: 9.65 10*3/MM3 (ref 3.4–10.8)

## 2023-12-17 PROCEDURE — 80053 COMPREHEN METABOLIC PANEL: CPT | Performed by: STUDENT IN AN ORGANIZED HEALTH CARE EDUCATION/TRAINING PROGRAM

## 2023-12-17 PROCEDURE — 83735 ASSAY OF MAGNESIUM: CPT | Performed by: STUDENT IN AN ORGANIZED HEALTH CARE EDUCATION/TRAINING PROGRAM

## 2023-12-17 PROCEDURE — 85025 COMPLETE CBC W/AUTO DIFF WBC: CPT | Performed by: STUDENT IN AN ORGANIZED HEALTH CARE EDUCATION/TRAINING PROGRAM

## 2023-12-17 PROCEDURE — 93010 ELECTROCARDIOGRAM REPORT: CPT | Performed by: INTERNAL MEDICINE

## 2023-12-17 PROCEDURE — 93005 ELECTROCARDIOGRAM TRACING: CPT | Performed by: PSYCHIATRY & NEUROLOGY

## 2023-12-17 PROCEDURE — 81003 URINALYSIS AUTO W/O SCOPE: CPT | Performed by: STUDENT IN AN ORGANIZED HEALTH CARE EDUCATION/TRAINING PROGRAM

## 2023-12-17 PROCEDURE — 82077 ASSAY SPEC XCP UR&BREATH IA: CPT | Performed by: STUDENT IN AN ORGANIZED HEALTH CARE EDUCATION/TRAINING PROGRAM

## 2023-12-17 PROCEDURE — 36415 COLL VENOUS BLD VENIPUNCTURE: CPT

## 2023-12-17 PROCEDURE — 80307 DRUG TEST PRSMV CHEM ANLYZR: CPT | Performed by: STUDENT IN AN ORGANIZED HEALTH CARE EDUCATION/TRAINING PROGRAM

## 2023-12-17 PROCEDURE — 99285 EMERGENCY DEPT VISIT HI MDM: CPT

## 2023-12-17 RX ORDER — LORAZEPAM 0.5 MG/1
0.5 TABLET ORAL EVERY 4 HOURS PRN
Status: ACTIVE | OUTPATIENT
Start: 2023-12-21 | End: 2023-12-22

## 2023-12-17 RX ORDER — NICOTINE 21 MG/24HR
1 PATCH, TRANSDERMAL 24 HOURS TRANSDERMAL
Status: DISCONTINUED | OUTPATIENT
Start: 2023-12-17 | End: 2023-12-22 | Stop reason: HOSPADM

## 2023-12-17 RX ORDER — BUPRENORPHINE HYDROCHLORIDE AND NALOXONE HYDROCHLORIDE DIHYDRATE 8; 2 MG/1; MG/1
1.5 TABLET SUBLINGUAL DAILY
Status: CANCELLED | OUTPATIENT
Start: 2023-12-17

## 2023-12-17 RX ORDER — HYDROXYZINE 50 MG/1
50 TABLET, FILM COATED ORAL EVERY 6 HOURS PRN
Status: DISCONTINUED | OUTPATIENT
Start: 2023-12-17 | End: 2023-12-22 | Stop reason: HOSPADM

## 2023-12-17 RX ORDER — ECHINACEA PURPUREA EXTRACT 125 MG
2 TABLET ORAL AS NEEDED
Status: DISCONTINUED | OUTPATIENT
Start: 2023-12-17 | End: 2023-12-22 | Stop reason: HOSPADM

## 2023-12-17 RX ORDER — CLONIDINE HYDROCHLORIDE 0.1 MG/1
0.1 TABLET ORAL ONCE AS NEEDED
Status: ACTIVE | OUTPATIENT
Start: 2023-12-21 | End: 2023-12-22

## 2023-12-17 RX ORDER — FAMOTIDINE 20 MG/1
20 TABLET, FILM COATED ORAL 2 TIMES DAILY PRN
Status: DISCONTINUED | OUTPATIENT
Start: 2023-12-17 | End: 2023-12-22 | Stop reason: HOSPADM

## 2023-12-17 RX ORDER — LORAZEPAM 2 MG/1
2 TABLET ORAL
Qty: 3 TABLET | Refills: 0 | Status: COMPLETED | OUTPATIENT
Start: 2023-12-18 | End: 2023-12-18

## 2023-12-17 RX ORDER — CYCLOBENZAPRINE HCL 10 MG
10 TABLET ORAL 3 TIMES DAILY PRN
Status: DISCONTINUED | OUTPATIENT
Start: 2023-12-17 | End: 2023-12-22 | Stop reason: HOSPADM

## 2023-12-17 RX ORDER — IBUPROFEN 400 MG/1
400 TABLET ORAL EVERY 6 HOURS PRN
Status: DISCONTINUED | OUTPATIENT
Start: 2023-12-17 | End: 2023-12-22 | Stop reason: HOSPADM

## 2023-12-17 RX ORDER — ACETAMINOPHEN 325 MG/1
650 TABLET ORAL EVERY 6 HOURS PRN
COMMUNITY
End: 2023-12-22 | Stop reason: HOSPADM

## 2023-12-17 RX ORDER — HYDRALAZINE HYDROCHLORIDE 25 MG/1
25 TABLET, FILM COATED ORAL DAILY PRN
Status: DISCONTINUED | OUTPATIENT
Start: 2023-12-17 | End: 2023-12-22 | Stop reason: HOSPADM

## 2023-12-17 RX ORDER — CLONIDINE HYDROCHLORIDE 0.1 MG/1
0.1 TABLET ORAL 4 TIMES DAILY PRN
Status: ACTIVE | OUTPATIENT
Start: 2023-12-17 | End: 2023-12-18

## 2023-12-17 RX ORDER — LORAZEPAM 0.5 MG/1
0.5 TABLET ORAL
Qty: 3 TABLET | Refills: 0 | Status: COMPLETED | OUTPATIENT
Start: 2023-12-21 | End: 2023-12-21

## 2023-12-17 RX ORDER — BUPRENORPHINE HYDROCHLORIDE AND NALOXONE HYDROCHLORIDE DIHYDRATE 8; 2 MG/1; MG/1
1.5 TABLET SUBLINGUAL DAILY
COMMUNITY
End: 2023-12-22 | Stop reason: HOSPADM

## 2023-12-17 RX ORDER — LORAZEPAM 2 MG/1
2 TABLET ORAL EVERY 4 HOURS PRN
Status: ACTIVE | OUTPATIENT
Start: 2023-12-18 | End: 2023-12-19

## 2023-12-17 RX ORDER — LORAZEPAM 1 MG/1
1 TABLET ORAL EVERY 4 HOURS PRN
Status: ACTIVE | OUTPATIENT
Start: 2023-12-20 | End: 2023-12-21

## 2023-12-17 RX ORDER — MULTIPLE VITAMINS W/ MINERALS TAB 9MG-400MCG
1 TAB ORAL DAILY
Status: DISCONTINUED | OUTPATIENT
Start: 2023-12-17 | End: 2023-12-22 | Stop reason: HOSPADM

## 2023-12-17 RX ORDER — MULTIVITAMIN WITH IRON
2 TABLET ORAL DAILY
Status: DISCONTINUED | OUTPATIENT
Start: 2023-12-17 | End: 2023-12-22 | Stop reason: HOSPADM

## 2023-12-17 RX ORDER — ACETAMINOPHEN 325 MG/1
650 TABLET ORAL EVERY 6 HOURS PRN
Status: DISCONTINUED | OUTPATIENT
Start: 2023-12-17 | End: 2023-12-22 | Stop reason: HOSPADM

## 2023-12-17 RX ORDER — LORAZEPAM 2 MG/1
2 TABLET ORAL
Status: DISPENSED | OUTPATIENT
Start: 2023-12-17 | End: 2023-12-18

## 2023-12-17 RX ORDER — CLONIDINE HYDROCHLORIDE 0.1 MG/1
0.1 TABLET ORAL 3 TIMES DAILY PRN
Status: ACTIVE | OUTPATIENT
Start: 2023-12-19 | End: 2023-12-20

## 2023-12-17 RX ORDER — ACETAMINOPHEN 325 MG/1
650 TABLET ORAL EVERY 6 HOURS PRN
Status: CANCELLED | OUTPATIENT
Start: 2023-12-17

## 2023-12-17 RX ORDER — ALUMINA, MAGNESIA, AND SIMETHICONE 2400; 2400; 240 MG/30ML; MG/30ML; MG/30ML
15 SUSPENSION ORAL EVERY 6 HOURS PRN
Status: DISCONTINUED | OUTPATIENT
Start: 2023-12-17 | End: 2023-12-22 | Stop reason: HOSPADM

## 2023-12-17 RX ORDER — ONDANSETRON 4 MG/1
4 TABLET, FILM COATED ORAL EVERY 6 HOURS PRN
Status: DISCONTINUED | OUTPATIENT
Start: 2023-12-17 | End: 2023-12-22 | Stop reason: HOSPADM

## 2023-12-17 RX ORDER — TRAZODONE HYDROCHLORIDE 50 MG/1
50 TABLET ORAL NIGHTLY PRN
Status: DISCONTINUED | OUTPATIENT
Start: 2023-12-17 | End: 2023-12-22 | Stop reason: HOSPADM

## 2023-12-17 RX ORDER — DICYCLOMINE HYDROCHLORIDE 10 MG/1
10 CAPSULE ORAL 3 TIMES DAILY PRN
Status: DISCONTINUED | OUTPATIENT
Start: 2023-12-17 | End: 2023-12-22 | Stop reason: HOSPADM

## 2023-12-17 RX ORDER — BENZTROPINE MESYLATE 1 MG/ML
1 INJECTION INTRAMUSCULAR; INTRAVENOUS ONCE AS NEEDED
Status: DISCONTINUED | OUTPATIENT
Start: 2023-12-17 | End: 2023-12-22 | Stop reason: HOSPADM

## 2023-12-17 RX ORDER — CLONIDINE HYDROCHLORIDE 0.1 MG/1
0.1 TABLET ORAL 2 TIMES DAILY PRN
Status: ACTIVE | OUTPATIENT
Start: 2023-12-20 | End: 2023-12-21

## 2023-12-17 RX ORDER — IBUPROFEN 400 MG/1
400 TABLET ORAL EVERY 6 HOURS PRN
Status: CANCELLED | OUTPATIENT
Start: 2023-12-17

## 2023-12-17 RX ORDER — BENZONATATE 100 MG/1
100 CAPSULE ORAL 3 TIMES DAILY PRN
Status: DISCONTINUED | OUTPATIENT
Start: 2023-12-17 | End: 2023-12-22 | Stop reason: HOSPADM

## 2023-12-17 RX ORDER — LOPERAMIDE HYDROCHLORIDE 2 MG/1
2 CAPSULE ORAL
Status: DISCONTINUED | OUTPATIENT
Start: 2023-12-17 | End: 2023-12-22 | Stop reason: HOSPADM

## 2023-12-17 RX ORDER — IBUPROFEN 400 MG/1
400 TABLET ORAL EVERY 6 HOURS PRN
COMMUNITY
End: 2023-12-22 | Stop reason: HOSPADM

## 2023-12-17 RX ORDER — BENZTROPINE MESYLATE 1 MG/1
2 TABLET ORAL ONCE AS NEEDED
Status: DISCONTINUED | OUTPATIENT
Start: 2023-12-17 | End: 2023-12-22 | Stop reason: HOSPADM

## 2023-12-17 RX ORDER — LORAZEPAM 1 MG/1
1 TABLET ORAL
Qty: 3 TABLET | Refills: 0 | Status: COMPLETED | OUTPATIENT
Start: 2023-12-20 | End: 2023-12-20

## 2023-12-17 RX ORDER — CLONIDINE HYDROCHLORIDE 0.1 MG/1
0.1 TABLET ORAL 4 TIMES DAILY PRN
Status: ACTIVE | OUTPATIENT
Start: 2023-12-18 | End: 2023-12-19

## 2023-12-17 RX ADMIN — TRAZODONE HYDROCHLORIDE 50 MG: 50 TABLET ORAL at 21:14

## 2023-12-17 RX ADMIN — HYDROXYZINE HYDROCHLORIDE 50 MG: 50 TABLET ORAL at 21:14

## 2023-12-17 RX ADMIN — IBUPROFEN 400 MG: 400 TABLET, FILM COATED ORAL at 21:14

## 2023-12-17 RX ADMIN — Medication 100 MG: at 18:52

## 2023-12-17 RX ADMIN — Medication 1 TABLET: at 18:52

## 2023-12-17 RX ADMIN — Medication 2 TABLET: at 18:52

## 2023-12-17 RX ADMIN — CYCLOBENZAPRINE 10 MG: 10 TABLET, FILM COATED ORAL at 21:14

## 2023-12-17 RX ADMIN — LORAZEPAM 2 MG: 2 TABLET ORAL at 18:52

## 2023-12-17 NOTE — ED PROVIDER NOTES
"Subjective   History of Present Illness  Patient is a 38-year-old male presents for detox from alcohol, meth and thc.  Patient reports he recently went through detox here.  Patient reports at that time he was drinking about a gallon of vodka daily.  Patient states and states that he talks he is again started drinking and is back up to 1/5 of vodka daily.  Patient reports he is also on Suboxone states he takes 1-1/2 strips of the 8 mg buprenorphine daily.  Patient reports he is also used meth but only for the last few days and does not typically do this.  Patient reports he took meth to attempt to get off alcohol.  Patient reports he also smokes marijuana.  Patient denies any other complaints.  Patient states currently he does not feel too bad.  Patient denies any homicidal or suicidal ideation.    Psychiatric Evaluation      Review of Systems   Constitutional: Negative.    HENT: Negative.     Eyes: Negative.    Respiratory: Negative.     Cardiovascular: Negative.    Gastrointestinal: Negative.    Endocrine: Negative.    Genitourinary: Negative.    Musculoskeletal: Negative.    Skin: Negative.    Allergic/Immunologic: Negative.    Neurological: Negative.    Hematological: Negative.    Psychiatric/Behavioral: Negative.         Past Medical History:   Diagnosis Date    Acid reflux     Alcohol abuse     HTN (hypertension)        No Known Allergies    Past Surgical History:   Procedure Laterality Date    FOOT SURGERY Left     HIP FUSION Left     age 24    SKIN GRAFT Right     \"left face, left shoulder, bilat hands, left foot\" age 8       Family History   Problem Relation Age of Onset    Alcohol abuse Mother     Alcohol abuse Father        Social History     Socioeconomic History    Marital status: Legally     Number of children: 1    Years of education: 12    Highest education level: High school graduate   Tobacco Use    Smoking status: Every Day     Packs/day: 1.00     Years: 17.00     Additional pack years: " 0.00     Total pack years: 17.00     Types: Cigarettes    Smokeless tobacco: Never   Vaping Use    Vaping Use: Every day    Substances: Nicotine    Devices: Disposable   Substance and Sexual Activity    Alcohol use: Yes     Comment: see below    Drug use: Yes     Types: Marijuana, Amphetamines, Methamphetamines, Other     Comment: alcohol, suboxone, opiates    Sexual activity: Yes     Partners: Female           Objective   Physical Exam  Vitals and nursing note reviewed.   Constitutional:       Appearance: He is well-developed.   HENT:      Head: Normocephalic.      Right Ear: External ear normal.      Left Ear: External ear normal.   Eyes:      Conjunctiva/sclera: Conjunctivae normal.      Pupils: Pupils are equal, round, and reactive to light.   Cardiovascular:      Rate and Rhythm: Normal rate and regular rhythm.      Heart sounds: Normal heart sounds.   Pulmonary:      Effort: Pulmonary effort is normal.      Breath sounds: Normal breath sounds.   Abdominal:      General: Bowel sounds are normal.      Palpations: Abdomen is soft.   Musculoskeletal:         General: Normal range of motion.      Cervical back: Normal range of motion and neck supple.   Skin:     General: Skin is warm and dry.      Capillary Refill: Capillary refill takes less than 2 seconds.   Neurological:      Mental Status: He is alert and oriented to person, place, and time.   Psychiatric:         Behavior: Behavior normal.         Thought Content: Thought content normal.         Procedures       Results for orders placed or performed during the hospital encounter of 12/17/23   Comprehensive Metabolic Panel    Specimen: Arm, Right; Blood   Result Value Ref Range    Glucose 171 (H) 65 - 99 mg/dL    BUN 23 (H) 6 - 20 mg/dL    Creatinine 1.24 0.76 - 1.27 mg/dL    Sodium 138 136 - 145 mmol/L    Potassium 3.7 3.5 - 5.2 mmol/L    Chloride 99 98 - 107 mmol/L    CO2 21.3 (L) 22.0 - 29.0 mmol/L    Calcium 9.5 8.6 - 10.5 mg/dL    Total Protein 7.9 6.0 -  8.5 g/dL    Albumin 4.8 3.5 - 5.2 g/dL    ALT (SGPT) 43 (H) 1 - 41 U/L    AST (SGOT) 51 (H) 1 - 40 U/L    Alkaline Phosphatase 101 39 - 117 U/L    Total Bilirubin 0.8 0.0 - 1.2 mg/dL    Globulin 3.1 gm/dL    A/G Ratio 1.5 g/dL    BUN/Creatinine Ratio 18.5 7.0 - 25.0    Anion Gap 17.7 (H) 5.0 - 15.0 mmol/L    eGFR 77.3 >60.0 mL/min/1.73   Urinalysis With Microscopic If Indicated (No Culture) - Urine, Clean Catch    Specimen: Urine, Clean Catch   Result Value Ref Range    Color, UA Yellow Yellow, Straw    Appearance, UA Clear Clear    pH, UA 5.5 5.0 - 8.0    Specific Gravity, UA 1.007 1.005 - 1.030    Glucose, UA Negative Negative    Ketones, UA Negative Negative    Bilirubin, UA Negative Negative    Blood, UA Negative Negative    Protein, UA Negative Negative    Leuk Esterase, UA Negative Negative    Nitrite, UA Negative Negative    Urobilinogen, UA 0.2 E.U./dL 0.2 - 1.0 E.U./dL   Urine Drug Screen - Urine, Clean Catch    Specimen: Urine, Clean Catch   Result Value Ref Range    THC, Screen, Urine Negative Negative    Phencyclidine (PCP), Urine Negative Negative    Cocaine Screen, Urine Negative Negative    Methamphetamine, Ur Positive (A) Negative    Opiate Screen Negative Negative    Amphetamine Screen, Urine Positive (A) Negative    Benzodiazepine Screen, Urine Negative Negative    Tricyclic Antidepressants Screen Negative Negative    Methadone Screen, Urine Negative Negative    Barbiturates Screen, Urine Negative Negative    Oxycodone Screen, Urine Negative Negative    Buprenorphine, Screen, Urine Positive (A) Negative   Ethanol    Specimen: Arm, Right; Blood   Result Value Ref Range    Ethanol 64 (H) 0 - 10 mg/dL    Ethanol % 0.064 %   Magnesium    Specimen: Arm, Right; Blood   Result Value Ref Range    Magnesium 2.2 1.6 - 2.6 mg/dL   CBC Auto Differential    Specimen: Arm, Right; Blood   Result Value Ref Range    WBC 9.65 3.40 - 10.80 10*3/mm3    RBC 4.01 (L) 4.14 - 5.80 10*6/mm3    Hemoglobin 13.7 13.0 - 17.7  g/dL    Hematocrit 40.3 37.5 - 51.0 %    .5 (H) 79.0 - 97.0 fL    MCH 34.2 (H) 26.6 - 33.0 pg    MCHC 34.0 31.5 - 35.7 g/dL    RDW 13.2 12.3 - 15.4 %    RDW-SD 49.6 37.0 - 54.0 fl    MPV 9.0 6.0 - 12.0 fL    Platelets 229 140 - 450 10*3/mm3    Neutrophil % 70.8 42.7 - 76.0 %    Lymphocyte % 21.3 19.6 - 45.3 %    Monocyte % 5.2 5.0 - 12.0 %    Eosinophil % 1.9 0.3 - 6.2 %    Basophil % 0.6 0.0 - 1.5 %    Immature Grans % 0.2 0.0 - 0.5 %    Neutrophils, Absolute 6.83 1.70 - 7.00 10*3/mm3    Lymphocytes, Absolute 2.06 0.70 - 3.10 10*3/mm3    Monocytes, Absolute 0.50 0.10 - 0.90 10*3/mm3    Eosinophils, Absolute 0.18 0.00 - 0.40 10*3/mm3    Basophils, Absolute 0.06 0.00 - 0.20 10*3/mm3    Immature Grans, Absolute 0.02 0.00 - 0.05 10*3/mm3    nRBC 0.0 0.0 - 0.2 /100 WBC   Fentanyl, Urine - Urine, Clean Catch    Specimen: Urine, Clean Catch   Result Value Ref Range    Fentanyl, Urine Negative Negative     No orders to display         ED Course                                             Medical Decision Making  Amount and/or Complexity of Data Reviewed  Labs: ordered.        Final diagnoses:   Alcohol dependence with uncomplicated withdrawal   Polysubstance abuse       ED Disposition  ED Disposition       ED Disposition   DC/Transfer to Behavioral Health Condition   Stable    Comment   --               No follow-up provider specified.       Medication List      No changes were made to your prescriptions during this visit.            Steve Hernández P, APRN  12/17/23 [id number]

## 2023-12-17 NOTE — NURSING NOTE
Etoh 66. Intake assessment completed. Pt came in for detox off etoh, meth, suboxone, and thc. Pt states he drinks a fifth of vodka daily, last drink at 1 pm today. He uses meth 3-4 times a week, last use 12/16/23. He takes 1.5 films of suboxone daily, last taken 12/16/23. He smokes marijuana when he has it, last use was last week. Denies HI AVH. He denies SI currently but states he has had some thoughts this past month. Denies having a plan. COWS 6 CIWA 6. He rates craving 4/10, anxiety 5/10, and depression 3/10. He reports poor sleep and appetite.

## 2023-12-17 NOTE — NURSING NOTE
Called and spoke to Dr. Santos. Given assessment, labs, and clinicals. Instructed to admit this pt with routine orders, ativan and clonidine detox. RBTOx2 Discussed with ER provider.

## 2023-12-17 NOTE — PLAN OF CARE
Problem: Adult Behavioral Health Plan of Care  Goal: Plan of Care Review  Recent Flowsheet Documentation  Taken 12/17/2023 1817 by Diane Salazar, RN  Plan of Care Reviewed With: patient  Patient Agreement with Plan of Care: agrees   Goal Outcome Evaluation:  Plan of Care Reviewed With: patient  Patient Agreement with Plan of Care: agrees         New admission.  Care plan initiated.

## 2023-12-18 LAB
HAV IGM SERPL QL IA: ABNORMAL
HBV CORE IGM SERPL QL IA: ABNORMAL
HBV SURFACE AG SERPL QL IA: ABNORMAL
HCV AB SER DONR QL: REACTIVE

## 2023-12-18 PROCEDURE — 80074 ACUTE HEPATITIS PANEL: CPT | Performed by: PSYCHIATRY & NEUROLOGY

## 2023-12-18 PROCEDURE — 99222 1ST HOSP IP/OBS MODERATE 55: CPT | Performed by: PSYCHIATRY & NEUROLOGY

## 2023-12-18 RX ADMIN — Medication 1 TABLET: at 08:08

## 2023-12-18 RX ADMIN — LORAZEPAM 2 MG: 2 TABLET ORAL at 21:23

## 2023-12-18 RX ADMIN — IBUPROFEN 400 MG: 400 TABLET, FILM COATED ORAL at 21:23

## 2023-12-18 RX ADMIN — LORAZEPAM 2 MG: 2 TABLET ORAL at 14:24

## 2023-12-18 RX ADMIN — LORAZEPAM 2 MG: 2 TABLET ORAL at 08:08

## 2023-12-18 RX ADMIN — Medication 100 MG: at 08:08

## 2023-12-18 RX ADMIN — Medication 2 TABLET: at 08:08

## 2023-12-18 NOTE — DISCHARGE PLACEMENT REQUEST
"Aniceto Toledo (38 y.o. Male)       Date of Birth   1985    Social Security Number       Address   [address] [place] [address]    Home Phone   [phone]    MRN   [id number]       Tenriism   None    Marital Status   Legally                             Admission Date   23    Admission Type   Emergency    Admitting Provider   Kyle Santos MD    Attending Provider   Camron Beckwith MD    Department, Room/Bed   [facility] ADULT CD, 1039/2S       Discharge Date       Discharge Disposition       Discharge Destination                                 Attending Provider: Camron Beckwith MD    Allergies: No Known Allergies    Isolation: None   Infection: None   Code Status: CPR    Ht: 167.6 cm (66\")   Wt: 64.4 kg (142 lb)    Admission Cmt: None   Principal Problem: None                  Active Insurance as of 2023       Primary Coverage       Payor Plan Insurance Group Employer/Plan Group    [employer] [place] [employer]        Payor Plan Address Payor Plan Phone Number Payor Plan Fax Number Effective Dates    [address]   2023 - None Entered    [facility] [id number]         Subscriber Name Subscriber Birth Date Member ID       ANICETO TOLEDO 1985 [id number]                     Emergency Contacts        (Rel.) Home Phone Work Phone Mobile Phone    Harmony Rivera (Friend) [phone] -- --                 History & Physical        Camron Beckwith MD at 23 1208                INITIAL PSYCHIATRIC HISTORY & PHYSICAL    Patient Identification:  Name:  Aniceto Toledo  Age:  38 y.o.  Sex:  male  :  1985  MRN:  [id number]   Visit Number:  [id number]  Primary Care Physician:  Provider, No Known    SUBJECTIVE    CC/Focus of Exam: Polysubstance abuse    HPI: Aniceto Toledo is a 38 y.o. male who was admitted on 2023 with complaints of alcohol and opioid use and withdrawals. The patient reports a long " "history of substance use. First use was at age 23 when he was prescribed pain pills for injury to left heel. He started using meth at age 27 and drinking at age 35. . Over time the use increased and the patient  continued to use despite negative consequences. The patient endorses symptoms of tolerance and withdrawals. Has tried to cut down and stop but has not been successful. Spends too much time and resources in pursuit of substance use. Longest period of sobriety is reported to be 3.5 years..  Currently using alcohol a fifth daily, last use was yesterday, Suboxone 12 mg daily and last use was 2-3 days ago. Methamphetamine 20 bag every two days intranasally, last use 4 days ago.   Withdrawal symptoms tremors, fatigue    PAST PSYCHIATRIC HX: Patient reports he has been treated for anxiety in the past.     SUBSTANCE USE HX: See HPI.     SOCIAL HX:   Social History     Socioeconomic History    Marital status: Legally     Number of children: 1    Years of education: 12    Highest education level: High school graduate   Tobacco Use    Smoking status: Every Day     Packs/day: 1.00     Years: 17.00     Additional pack years: 0.00     Total pack years: 17.00     Types: Cigarettes    Smokeless tobacco: Never   Vaping Use    Vaping Use: Every day    Substances: Nicotine    Devices: Disposable   Substance and Sexual Activity    Alcohol use: Yes     Comment: see below    Drug use: Yes     Types: Marijuana, Amphetamines, Methamphetamines, Other     Comment: alcohol, suboxone, opiates    Sexual activity: Yes     Partners: Female         Past Medical History:   Diagnosis Date    Acid reflux     Alcohol abuse     Alcoholism     HTN (hypertension)     Seizure due to alcohol withdrawal     July 2023 per pt          Past Surgical History:   Procedure Laterality Date    FOOT SURGERY Left     HIP FUSION Left     age 24    SKIN GRAFT Right     \"left face, left shoulder, bilat hands, left foot\" age 8       Family History "   Problem Relation Age of Onset    Alcohol abuse Mother     Alcohol abuse Father          Medications Prior to Admission   Medication Sig Dispense Refill Last Dose    buprenorphine-naloxone (SUBOXONE) 8-2 MG per SL tablet Place 1.5 tablets under the tongue Daily.   Past Week    acetaminophen (TYLENOL) 325 MG tablet Take 2 tablets by mouth Every 6 (Six) Hours As Needed for Mild Pain.   Unknown    ibuprofen (ADVIL,MOTRIN) 400 MG tablet Take 1 tablet by mouth Every 6 (Six) Hours As Needed for Mild Pain.   Unknown         ALLERGIES:  Patient has no known allergies.    Temp:  [96.5 °F (35.8 °C)-98 °F (36.7 °C)] 97.5 °F (36.4 °C)  Heart Rate:  [64-98] 93  Resp:  [16-18] 16  BP: (103-128)/(50-83) 128/83    REVIEW OF SYSTEMS:  Review of Systems   Constitutional:  Positive for chills, diaphoresis and fatigue.   HENT: Negative.     Eyes: Negative.    Cardiovascular: Negative.    Gastrointestinal:  Positive for nausea.   Endocrine: Negative.    Genitourinary: Negative.    Musculoskeletal:  Positive for myalgias.   Skin: Negative.    Neurological:  Positive for tremors and weakness.   Hematological: Negative.    Psychiatric/Behavioral:  Positive for dysphoric mood. The patient is nervous/anxious.         OBJECTIVE    PHYSICAL EXAM:  Physical Exam  Constitutional:  Appears well-developed and well-nourished.   HENT:   Head: Normocephalic and atraumatic.   Right Ear: External ear normal.   Left Ear: External ear normal.   Mouth/Throat: Oropharynx is clear and moist.   Eyes: Pupils are equal, round, and reactive to light. Conjunctivae and EOM are normal.   Neck: Normal range of motion. Neck supple.   Cardiovascular: Normal rate, regular rhythm and normal heart sounds.    Respiratory: Effort normal and breath sounds normal. No respiratory distress. No wheezes.   GI: Soft. Bowel sounds are normal.No distension. There is no tenderness.   Musculoskeletal: Normal range of motion. No edema or deformity.   Neurological:No cranial nerve  deficit. Coordination normal.   Skin: Skin is warm and dry. No rash noted. No erythema.     MENTAL STATUS EXAM:   Hygiene:   fair  Cooperation:  Cooperative  Eye Contact:  Fair  Psychomotor Behavior:  Appropriate  Affect:  Appropriate  Hopelessness: Denies  Speech:  Normal  Thought Process: Goal directed  Thought Content:  Normal  Suicidal:  None  Homicidal:  None  Hallucinations:  None  Delusion:  None  Memory:  Intact  Orientation:  Person, Place, Time and Situation  Reliability:  fair  Insight:  Fair  Judgement:  Fair  Impulse Control:  Fair    Imaging Results (Last 24 Hours)       ** No results found for the last 24 hours. **             ECG/EMG Results (most recent)       Procedure Component Value Units Date/Time    ECG 12 Lead Other; Baseline Cardiac Status [[id number]] Collected: 12/17/23 2238     Updated: 12/17/23 2239     QT Interval 392 ms      QTC Interval 463 ms     Narrative:      Test Reason : Other~  Blood Pressure :   */*   mmHG  Vent. Rate :  84 BPM     Atrial Rate :  84 BPM     P-R Int : 134 ms          QRS Dur :  82 ms      QT Int : 392 ms       P-R-T Axes :  54  88  67 degrees     QTc Int : 463 ms    Normal sinus rhythm  Early repolarization  Normal ECG  When compared with ECG of 17-JUL-2023 22:25,  ST elevation now present in Anterior leads    Referred By:            Confirmed By:              Lab Results   Component Value Date    GLUCOSE 171 (H) 12/17/2023    BUN 23 (H) 12/17/2023    CREATININE 1.24 12/17/2023    BCR 18.5 12/17/2023    CO2 21.3 (L) 12/17/2023    CALCIUM 9.5 12/17/2023    ALBUMIN 4.8 12/17/2023    AST 51 (H) 12/17/2023    ALT 43 (H) 12/17/2023       Lab Results   Component Value Date    WBC 9.65 12/17/2023    HGB 13.7 12/17/2023    HCT 40.3 12/17/2023    .5 (H) 12/17/2023     12/17/2023       Last Urine Toxicity  More data may exist         Latest Ref Rng & Units 12/17/2023 7/17/2023   LAST URINE TOXICITY RESULTS   Amphetamine, Urine Qual Negative Positive  Positive     Barbiturates Screen, Urine Negative Negative  Negative    Benzodiazepine Screen, Urine Negative Negative  Negative    Buprenorphine, Screen, Urine Negative Positive  Positive    Cocaine Screen, Urine Negative Negative  Negative    Fentanyl, Urine Negative Negative  Negative    Methadone Screen , Urine Negative Negative  Negative    Methamphetamine, Ur Negative Positive  Positive        Brief Urine Lab Results  (Last result in the past 365 days)        Color   Clarity   Blood   Leuk Est   Nitrite   Protein   CREAT   Urine HCG        12/17/23 1713 Yellow   Clear   Negative   Negative   Negative   Negative                   DATA  Labs reviewed.  Glucose 171, AST 51, ALT 43.  RBC low at 4.01.  .5, MCH 34.2.  Hep C reactive.  Urine drug screen positive for amphetamine, buprenorphine, methamphetamine.  EKG reviewed.  QTc interval 463 ms.  JONA reviewed.  Patient has an active prescription for Suboxone.  Record reviewed.  The patient was last here in July 2023 and was treated for alcohol use disorder.    Strengths: Motivated for treatment    Weaknesses:Substance use and Poor coping skills    Code status: Full  Discussed code status with patient.    ASSESSMENT & PLAN:    Hospital bed: No      Alcohol use disorder, severe, dependence    Alcohol withdrawal  -Ativan detox  -Thiamine and folate      Opioid use disorder, severe, dependence  -Clonidine detox      Methamphetamine use disorder, severe  -Supportive treatment      Nicotine use disorder  -Encouraged cessation    The patient has been admitted for safety and stabilization.  Patient will be monitored for suicidality daily and maintained on Special Precautions Level 4 (q30 min checks).  The patient will have individual and group therapy with a master's level therapist. A master treatment plan will be developed and agreed upon by the patient and his/her treatment team.  The patient's estimated length of stay in the hospital is 5-7 days.              Electronically signed by Camron Beckwith MD at 12/18/23 1315       Physician Progress Notes (last 24 hours)  Notes from 12/17/23 1355 through 12/18/23 1355   No notes of this type exist for this encounter.

## 2023-12-18 NOTE — PLAN OF CARE
"Goal Outcome Evaluation:  Plan of Care Reviewed With: patient  Patient Agreement with Plan of Care: agrees     Progress: no change  Outcome Evaluation: Patient appears calm and is cooperative with activities on unit today. Patient is drowsy most of shift and mostly only came out of room for meal times. Was able to sleep well and had fair appetite. Patient states his anxiety is a 2 out of 10, his depression is an 8 out of 10, and cravings are a 0 out of 10. Patient denies SI/HI/AVH. Patient rates pain a 0 out of 10 but does state that he is experiencing generlaized soreness. Patient also reports slight nausea and states he mentally feels \"off in space\" but can answer questions appropriately. Patient medicated per medication orders. No distress or complaints noted.         "

## 2023-12-18 NOTE — PLAN OF CARE
"Goal Outcome Evaluation:  Plan of Care Reviewed With: patient  Patient Agreement with Plan of Care: agrees        Outcome Evaluation: Patient reports appetite good and sleep fair. Patient rates anxiety 2/10 and depression 5/10 with an overall mood of \"feeling pretty good.\" Patient denies any SI, HI, or AVH with no plans this shift. Patient rates pain 10/10 in legs with cramping and aching. Patient admits to withdrawal s/s; tremors, cramping, and aching in generalized all over body. Patient rates cravings 7/10. Patient education coping strategies; being aware of them and noticing patterns is an important first step. Techniques such as calm breathing, muscle relaxation, going for a walk and problem-solving to manage stressors that can be controlled will be helpful. Finding what works for the individual is important.         "

## 2023-12-18 NOTE — PROGRESS NOTES
Navigator is helping Primary Therapist with discharge planning for patient. Navigator completed the following referrals on this day:    [facility] - [phone]  -Sent 12/18    [facility] - [phone]  -Sent 12/18  -Intake received referral. Patient to call and complete phone screening when able.  12/18    [facility] - [phone]  -Sent 12/18  -Intake called. They received referral. Patient to call and complete phone screening when able.  12/18

## 2023-12-18 NOTE — H&P
INITIAL PSYCHIATRIC HISTORY & PHYSICAL    Patient Identification:  Name:  Aniceto Toledo  Age:  38 y.o.  Sex:  male  :  1985  MRN:  [id number]   Visit Number:  [id number]  Primary Care Physician:  Provider, No Known    SUBJECTIVE    CC/Focus of Exam: Polysubstance abuse    HPI: Aniceto Toledo is a 38 y.o. male who was admitted on 2023 with complaints of alcohol and opioid use and withdrawals. The patient reports a long history of substance use. First use was at age 23 when he was prescribed pain pills for injury to left heel. He started using meth at age 27 and drinking at age 35. . Over time the use increased and the patient  continued to use despite negative consequences. The patient endorses symptoms of tolerance and withdrawals. Has tried to cut down and stop but has not been successful. Spends too much time and resources in pursuit of substance use. Longest period of sobriety is reported to be 3.5 years..  Currently using alcohol a fifth daily, last use was yesterday, Suboxone 12 mg daily and last use was 2-3 days ago. Methamphetamine 20 bag every two days intranasally, last use 4 days ago.   Withdrawal symptoms tremors, fatigue    PAST PSYCHIATRIC HX: Patient reports he has been treated for anxiety in the past.     SUBSTANCE USE HX: See HPI.     SOCIAL HX:   Social History     Socioeconomic History    Marital status: Legally     Number of children: 1    Years of education: 12    Highest education level: High school graduate   Tobacco Use    Smoking status: Every Day     Packs/day: 1.00     Years: 17.00     Additional pack years: 0.00     Total pack years: 17.00     Types: Cigarettes    Smokeless tobacco: Never   Vaping Use    Vaping Use: Every day    Substances: Nicotine    Devices: Disposable   Substance and Sexual Activity    Alcohol use: Yes     Comment: see below    Drug use: Yes     Types: Marijuana, Amphetamines, Methamphetamines, Other     Comment: alcohol, suboxone,  "opiates    Sexual activity: Yes     Partners: Female         Past Medical History:   Diagnosis Date    Acid reflux     Alcohol abuse     Alcoholism     HTN (hypertension)     Seizure due to alcohol withdrawal     July 2023 per pt          Past Surgical History:   Procedure Laterality Date    FOOT SURGERY Left     HIP FUSION Left     age 24    SKIN GRAFT Right     \"left face, left shoulder, bilat hands, left foot\" age 8       Family History   Problem Relation Age of Onset    Alcohol abuse Mother     Alcohol abuse Father          Medications Prior to Admission   Medication Sig Dispense Refill Last Dose    buprenorphine-naloxone (SUBOXONE) 8-2 MG per SL tablet Place 1.5 tablets under the tongue Daily.   Past Week    acetaminophen (TYLENOL) 325 MG tablet Take 2 tablets by mouth Every 6 (Six) Hours As Needed for Mild Pain.   Unknown    ibuprofen (ADVIL,MOTRIN) 400 MG tablet Take 1 tablet by mouth Every 6 (Six) Hours As Needed for Mild Pain.   Unknown         ALLERGIES:  Patient has no known allergies.    Temp:  [96.5 °F (35.8 °C)-98 °F (36.7 °C)] 97.5 °F (36.4 °C)  Heart Rate:  [64-98] 93  Resp:  [16-18] 16  BP: (103-128)/(50-83) 128/83    REVIEW OF SYSTEMS:  Review of Systems   Constitutional:  Positive for chills, diaphoresis and fatigue.   HENT: Negative.     Eyes: Negative.    Cardiovascular: Negative.    Gastrointestinal:  Positive for nausea.   Endocrine: Negative.    Genitourinary: Negative.    Musculoskeletal:  Positive for myalgias.   Skin: Negative.    Neurological:  Positive for tremors and weakness.   Hematological: Negative.    Psychiatric/Behavioral:  Positive for dysphoric mood. The patient is nervous/anxious.         OBJECTIVE    PHYSICAL EXAM:  Physical Exam  Constitutional:  Appears well-developed and well-nourished.   HENT:   Head: Normocephalic and atraumatic.   Right Ear: External ear normal.   Left Ear: External ear normal.   Mouth/Throat: Oropharynx is clear and moist.   Eyes: Pupils are equal, " round, and reactive to light. Conjunctivae and EOM are normal.   Neck: Normal range of motion. Neck supple.   Cardiovascular: Normal rate, regular rhythm and normal heart sounds.    Respiratory: Effort normal and breath sounds normal. No respiratory distress. No wheezes.   GI: Soft. Bowel sounds are normal.No distension. There is no tenderness.   Musculoskeletal: Normal range of motion. No edema or deformity.   Neurological:No cranial nerve deficit. Coordination normal.   Skin: Skin is warm and dry. No rash noted. No erythema.     MENTAL STATUS EXAM:   Hygiene:   fair  Cooperation:  Cooperative  Eye Contact:  Fair  Psychomotor Behavior:  Appropriate  Affect:  Appropriate  Hopelessness: Denies  Speech:  Normal  Thought Process: Goal directed  Thought Content:  Normal  Suicidal:  None  Homicidal:  None  Hallucinations:  None  Delusion:  None  Memory:  Intact  Orientation:  Person, Place, Time and Situation  Reliability:  fair  Insight:  Fair  Judgement:  Fair  Impulse Control:  Fair    Imaging Results (Last 24 Hours)       ** No results found for the last 24 hours. **             ECG/EMG Results (most recent)       Procedure Component Value Units Date/Time    ECG 12 Lead Other; Baseline Cardiac Status [[id number]] Collected: 12/17/23 2238     Updated: 12/17/23 2239     QT Interval 392 ms      QTC Interval 463 ms     Narrative:      Test Reason : Other~  Blood Pressure :   */*   mmHG  Vent. Rate :  84 BPM     Atrial Rate :  84 BPM     P-R Int : 134 ms          QRS Dur :  82 ms      QT Int : 392 ms       P-R-T Axes :  54  88  67 degrees     QTc Int : 463 ms    Normal sinus rhythm  Early repolarization  Normal ECG  When compared with ECG of 17-JUL-2023 22:25,  ST elevation now present in Anterior leads    Referred By:            Confirmed By:              Lab Results   Component Value Date    GLUCOSE 171 (H) 12/17/2023    BUN 23 (H) 12/17/2023    CREATININE 1.24 12/17/2023    BCR 18.5 12/17/2023    CO2 21.3 (L) 12/17/2023     CALCIUM 9.5 12/17/2023    ALBUMIN 4.8 12/17/2023    AST 51 (H) 12/17/2023    ALT 43 (H) 12/17/2023       Lab Results   Component Value Date    WBC 9.65 12/17/2023    HGB 13.7 12/17/2023    HCT 40.3 12/17/2023    .5 (H) 12/17/2023     12/17/2023       Last Urine Toxicity  More data may exist         Latest Ref Rng & Units 12/17/2023 7/17/2023   LAST URINE TOXICITY RESULTS   Amphetamine, Urine Qual Negative Positive  Positive    Barbiturates Screen, Urine Negative Negative  Negative    Benzodiazepine Screen, Urine Negative Negative  Negative    Buprenorphine, Screen, Urine Negative Positive  Positive    Cocaine Screen, Urine Negative Negative  Negative    Fentanyl, Urine Negative Negative  Negative    Methadone Screen , Urine Negative Negative  Negative    Methamphetamine, Ur Negative Positive  Positive        Brief Urine Lab Results  (Last result in the past 365 days)        Color   Clarity   Blood   Leuk Est   Nitrite   Protein   CREAT   Urine HCG        12/17/23 1713 Yellow   Clear   Negative   Negative   Negative   Negative                   DATA  Labs reviewed.  Glucose 171, AST 51, ALT 43.  RBC low at 4.01.  .5, MCH 34.2.  Hep C reactive.  Urine drug screen positive for amphetamine, buprenorphine, methamphetamine.  EKG reviewed.  QTc interval 463 ms.  JONA reviewed.  Patient has an active prescription for Suboxone.  Record reviewed.  The patient was last here in July 2023 and was treated for alcohol use disorder.    Strengths: Motivated for treatment    Weaknesses:Substance use and Poor coping skills    Code status: Full  Discussed code status with patient.    ASSESSMENT & PLAN:    Hospital bed: No      Alcohol use disorder, severe, dependence    Alcohol withdrawal  -Ativan detox  -Thiamine and folate      Opioid use disorder, severe, dependence  -Clonidine detox      Methamphetamine use disorder, severe  -Supportive treatment      Nicotine use disorder  -Encouraged cessation    The  patient has been admitted for safety and stabilization.  Patient will be monitored for suicidality daily and maintained on Special Precautions Level 4 (q30 min checks).  The patient will have individual and group therapy with a master's level therapist. A master treatment plan will be developed and agreed upon by the patient and his/her treatment team.  The patient's estimated length of stay in the hospital is 5-7 days.

## 2023-12-18 NOTE — PLAN OF CARE
Goal Outcome Evaluation:        Problem: Adult Behavioral Health Plan of Care  Goal: Patient-Specific Goal (Individualization)  Outcome: Ongoing, Progressing  Flowsheets  Taken 12/18/2023 0950 by Geraldine Webster CSW  Patient-Specific Goals (Include Timeframe): Identify 2-3 coping skills, address relapse prevention methods, complete aftercare plans, and deny SI/HI prior to discharge.  Individualized Care Needs: Therapist to offer 1-4 therapy sessions, aftercare planning, safety planning, family education, group therapy, and brief CBT/MI interventions.  Taken 12/18/2023 0942 by Geraldine Webster CSW  Patient Personal Strengths:   resourceful   resilient   motivated for treatment   motivated for recovery   positive educational history   stable living environment   family/social support   coping skills  Patient Vulnerabilities:   substance abuse/addiction   history of unsuccessful treatment   occupational insecurity   poor impulse control  Taken 12/17/2023 1817 by Diane Salazar RN  Anxieties, Fears or Concerns: None verbalized  Goal: Optimized Coping Skills in Response to Life Stressors  Outcome: Ongoing, Progressing  Flowsheets (Taken 12/18/2023 0950)  Optimized Coping Skills in Response to Life Stressors: making progress toward outcome  Intervention: Promote Effective Coping Strategies  Flowsheets (Taken 12/18/2023 0950)  Supportive Measures:   active listening utilized   counseling provided   decision-making supported   goal-setting facilitated   positive reinforcement provided   self-reflection promoted   self-responsibility promoted   self-care encouraged   relaxation techniques promoted   verbalization of feelings encouraged  Goal: Develops/Participates in Therapeutic [place] to Support Successful Transition  Outcome: Ongoing, Progressing  Flowsheets (Taken 12/18/2023 0950)  Develops/Participates in Therapeutic [place] to Support Successful Transition: making progress toward outcome  Intervention:  Foster Therapeutic [place]  Flowsheets (Taken 12/18/2023 0950)  Trust Relationship/Rapport:   care explained   questions encouraged   choices provided   reassurance provided   emotional support provided   thoughts/feelings acknowledged   empathic listening provided   questions answered  Intervention: Mutually Develop Transition Plan  Flowsheets  Taken 12/18/2023 0950  Outpatient/Agency/Support Group Needs: residential services  Transition Support:   follow-up care discussed   follow-up care coordinated   community resources reviewed   crisis management plan promoted   crisis management plan verbalized  Anticipated Discharge Disposition: residential substance use unit  Taken 12/18/2023 0949  Discharge Coordination/Progress: Patient has [employer] Medicaid. Therapist met with patient to complete assessment.  Transportation Anticipated: agency  Transportation Concerns: no car  Current Discharge Risk: substance use/abuse  Concerns to be Addressed:   substance/tobacco abuse/use   mental health   coping/stress   cognitive/perceptual   discharge planning  Readmission Within the Last 30 Days: no previous admission in last 30 days  Patient/Family Anticipated Services at Transition: rehabilitation services  Patient's Choice of Community Agency(s): To be determined  Patient/Family Anticipates Transition to: inpatient rehabilitation facility  Offered/Gave Vendor List: yes         DATA:      Therapist met individually with patient this date to introduce role and to discuss hospitalization expectations. Patient agreeable.     Patient gave verbal consent for [facility], [facility], and [facility].     Clinical Maneuvering/Intervention:     Therapist assisted patient in processing above session content; acknowledged and normalized patient’s thoughts, feelings, and concerns.  Discussed the therapist/patient relationship and explain the parameters and limitations of relative confidentiality.  Also discussed the importance of active  participation, and honesty to the treatment process.  Encouraged the patient to discuss/vent their feelings, frustrations, and fears concerning their ongoing medical issues and validated their feelings.     Discussed the importance of finding enjoyable activities and coping skills that the patient can engage in a regular basis. Discussed healthy coping skills such as distraction, self love, grounding, thought challenges/reframing, etc.  Provided patient with list of healthy coping skills this date. Discussed the importance of medication compliance.  Praised the patient for seeking help and spent the majority of the session building rapport.       Allowed patient to freely discuss issues without interruption or judgment. Provided safe, confidential environment to facilitate the development of positive therapeutic relationship and encourage open, honest communication.      Therapist addressed discharge safety planning this date. Assisted patient in identifying risk factors which would indicate the need for higher level of care after discharge;  including thoughts to harm self or others and/or self-harming behavior. Encouraged patient to call 911, or present to the nearest emergency room should any of these events occur. Discussed crisis intervention services and means to access.  Encouraged securing any objects of harm.       Therapist completed integrated summary, treatment plan, and initiated social history this date. Therapist is strongly encouraging family involvement in treatment.       ASSESSMENT:      The patient is a 37 y/o  male admitted for detox treatment. Therapist met with patient briefly on this date to complete assessment, he had some difficulty staying awake. Patient reports moderate anxiety and depression, denies SI/HI/AVH. Reports experiencing tremors and fatigue. Last [facility] admission was on 7/17/23. Patient reports current use of alcohol, methamphetamine, and THC. Patient began  using as a teen, longest period of sobriety has been 3.5 years. Patient has been living with his aunt but is interested in residential treatment at discharge. He is agreeable to any facility that will allow him to stay on his prescribed suboxone and gave consent for [facility], [facility], and [facility].      PLAN:       Patient to remain hospitalized this date.     Treatment team will focus efforts on stabilizing patient's acute symptoms while providing education on healthy coping and crisis management to reduce hospitalizations.   Patient requires daily psychiatrist evaluation and 24/7 nursing supervision to promote patient  safety.     Therapist will offer 1-4 individual sessions, 1 therapy group daily, family education, and appropriate referral.    Therapist recommends [facility].

## 2023-12-19 LAB
QT INTERVAL: 392 MS
QTC INTERVAL: 463 MS

## 2023-12-19 PROCEDURE — 99232 SBSQ HOSP IP/OBS MODERATE 35: CPT | Performed by: PSYCHIATRY & NEUROLOGY

## 2023-12-19 RX ADMIN — LORAZEPAM 1.5 MG: 1 TABLET ORAL at 14:36

## 2023-12-19 RX ADMIN — Medication 100 MG: at 08:15

## 2023-12-19 RX ADMIN — LORAZEPAM 1.5 MG: 1 TABLET ORAL at 21:07

## 2023-12-19 RX ADMIN — IBUPROFEN 400 MG: 400 TABLET, FILM COATED ORAL at 08:15

## 2023-12-19 RX ADMIN — LORAZEPAM 1.5 MG: 1 TABLET ORAL at 08:15

## 2023-12-19 RX ADMIN — Medication 1 TABLET: at 08:15

## 2023-12-19 RX ADMIN — Medication 2 TABLET: at 08:15

## 2023-12-19 RX ADMIN — CYCLOBENZAPRINE 10 MG: 10 TABLET, FILM COATED ORAL at 08:15

## 2023-12-19 NOTE — PLAN OF CARE
"Goal Outcome Evaluation:  Plan of Care Reviewed With: patient  Patient Agreement with Plan of Care: agrees     Progress: Improving      Outcome Evaluation: Pt. appears calm and is cooperative with activities on unit today. Patient is drowsy most of shift but does come out of room for meal time, free time, and group. Was able to sleep well and had fair appetite. Patient states his anxiety, depression, and cravings are a 0 out of 10. Patient denies SI/HI/AVH. Patient rates pain a 0 out of 10 but does state that his head feels pressure. Patient also reports that he's \"just tired\" and has restless legs and tight shoulders. Patient medicated per medication orders. No distress or complaints noted.        "

## 2023-12-19 NOTE — PROGRESS NOTES
Navigator is helping Primary Therapist with discharge planning for patient. Navigator completed the following referrals on this day:     [facility] - [phone]  -Sent 12/18  -Roz states patient is approved medically. She will attempt to complete phone screening later today.  12/19     [facility] - [phone]  -Sent 12/18  -Intake received referral. Patient to call and complete phone screening when able.  12/18     [facility] - [phone]  -Sent 12/18  -Intake called. They received referral. Patient to call and complete phone screening when able.  12/18  -Transferred call so patient could complete phone screening.  12/19

## 2023-12-19 NOTE — PLAN OF CARE
Goal Outcome Evaluation:        Problem: Adult Behavioral Health Plan of Care  Goal: Patient-Specific Goal (Individualization)  Outcome: Ongoing, Progressing  Flowsheets  Taken 12/18/2023 0950 by Geraldine Webster CSW  Patient-Specific Goals (Include Timeframe): Identify 2-3 coping skills, address relapse prevention methods, complete aftercare plans, and deny SI/HI prior to discharge.  Individualized Care Needs: Therapist to offer 1-4 therapy sessions, aftercare planning, safety planning, family education, group therapy, and brief CBT/MI interventions.  Taken 12/18/2023 0942 by Geraldine Webster CSW  Patient Personal Strengths:   resourceful   resilient   motivated for treatment   motivated for recovery   positive educational history   stable living environment   family/social support   coping skills  Patient Vulnerabilities:   substance abuse/addiction   history of unsuccessful treatment   occupational insecurity   poor impulse control  Taken 12/17/2023 1817 by Diane Salazar RN  Anxieties, Fears or Concerns: None verbalized  Goal: Optimized Coping Skills in Response to Life Stressors  Outcome: Ongoing, Progressing  Flowsheets (Taken 12/18/2023 0950)  Optimized Coping Skills in Response to Life Stressors: making progress toward outcome  Intervention: Promote Effective Coping Strategies  Flowsheets (Taken 12/19/2023 0841)  Supportive Measures:   active listening utilized   counseling provided   decision-making supported   goal-setting facilitated   self-reflection promoted   positive reinforcement provided   self-responsibility promoted   self-care encouraged   relaxation techniques promoted   verbalization of feelings encouraged  Goal: Develops/Participates in Therapeutic [place] to Support Successful Transition  Outcome: Ongoing, Progressing  Flowsheets (Taken 12/18/2023 0950)  Develops/Participates in Therapeutic [place] to Support Successful Transition: making progress toward outcome  Intervention:  Foster Therapeutic [place]  Flowsheets (Taken 12/19/2023 0841)  Trust Relationship/Rapport:   care explained   questions encouraged   choices provided   reassurance provided   emotional support provided   thoughts/feelings acknowledged   empathic listening provided   questions answered  Intervention: Mutually Develop Transition Plan  Flowsheets  Taken 12/19/2023 0840  Discharge Coordination/Progress: Patient has [employer] Medicaid. Referrals pending with [facility], and [facility].  Transportation Anticipated: agency  Transportation Concerns: no car  Current Discharge Risk: substance use/abuse  Concerns to be Addressed:   substance/tobacco abuse/use   mental health   coping/stress   cognitive/perceptual   discharge planning  Readmission Within the Last 30 Days: no previous admission in last 30 days  Patient/Family Anticipated Services at Transition: rehabilitation services  Patient's Choice of Community Agency(s): [facility] pending with [facility], [facility], and [facility]  Patient/Family Anticipates Transition to: inpatient rehabilitation facility  Offered/Gave Vendor List: yes  Taken 12/18/2023 0950  Outpatient/Agency/Support Group Needs: residential services  Transition Support:   follow-up care discussed   follow-up care coordinated   community resources reviewed   crisis management plan promoted   crisis management plan verbalized  Anticipated Discharge Disposition: residential substance use unit         DATA:  Therapist met with Patient individually this date. Patient agreeable to discuss current treatment progress and discharge concerns.     CLINICAL MANUVERING/INTERVENTIONS:  Assisted Patient in processing session content; acknowledged and normalized Patient’s thoughts, feelings, and concerns by utilizing a person-centered approach in efforts to build appropriate rapport and a positive therapeutic relationship with open and honest communication. Allowed Patient to ventilate regarding current stressors and  triggers for negative emotions and thoughts in a safe nonjudgmental environment with unconditional positive regard, active listening skills, and empathy. Therapist implemented motivational interviewing techniques to assist Patient with exploring personal growth and change and discussed distress tolerance skills, self soothing techniques, and applied cognitive behavioral strategies to facilitate identification of maladaptive patterns of thinking and behavior.Therapist utilized dialectical behavior techniques to teach and model emotional regulation and relaxation methods. Therapist assisted Patient with identifying and implementing healthier coping strategies. Therapist assisted Patient with safety planning; Patient agreed to continue honest communication with Treatment Team while inpatient and identify any SI/HI.  Patient encouraged to seek nearest ER or contact 911 if danger to self or others post discharge.     ASSESSMENT:    Therapist met with patient on this date, continues to receive treatment for detox. Patient was somewhat irritable, did not want to wake up and stay awake to talk to therapist. Patient reports mild anxiety and depression, denies SI/HI/AVH. Patient denies experiencing active withdrawal symptoms. Patient reports completing phone screening today with one of the rehabs that referrals were sent to, he is unsure of which one he talked to but states they told him they would let him know more tomorrow. Treatment team to follow up with agencies regarding referrals.     PLAN:   Patient will continue stabilization. Patient will continue to receive services offered by Treatment Team.     Therapist recommends [facility]. Referrals pending with [facility], [facility], and [facility].

## 2023-12-19 NOTE — PROGRESS NOTES
"INPATIENT PSYCHIATRIC PROGRESS NOTE    Name:  Aniceto Toledo  :  1985  MRN:  [id number]  Visit Number:  [id number]  Length of stay:  2    SUBJECTIVE    CC/Focus of Exam: alcohol use    INTERVAL HISTORY:  The patient reports he is feeling tired. He reports no active withdrawals and states his last use of alcohol was two days ago, and Suboxone was three days ago, and meth was three days ago.   Depression rating 3/10  Anxiety rating 3/10  Sleep: good  Withdrawal sx: No active withdrawals reported today.   Cravin/10    Review of Systems   Constitutional:  Positive for fatigue.   Respiratory: Negative.     Cardiovascular: Negative.    Gastrointestinal: Negative.        OBJECTIVE    Temp:  [96.5 °F (35.8 °C)-98 °F (36.7 °C)] 96.5 °F (35.8 °C)  Heart Rate:  [68-86] 79  Resp:  [16-20] 18  BP: ()/(55-73) 104/55    MENTAL STATUS EXAM:  Appearance:Casually dressed, good hygeine.   Cooperation:Cooperative  Psychomotor: No psychomotor agitation/retardation, No EPS, No motor tics  Speech-normal rate, amount.  Mood \"better\"   Affect-congruent, appropriate, stable  Thought Content-goal directed, no delusional material present  Thought process-linear, organized.  Suicidality: No SI  Homicidality: No HI  Perception: No AH/VH  Insight-fair   Judgement-fair    Lab Results (last 24 hours)       ** No results found for the last 24 hours. **               Imaging Results (Last 24 Hours)       ** No results found for the last 24 hours. **               ECG/EMG Results (most recent)       Procedure Component Value Units Date/Time    ECG 12 Lead Other; Baseline Cardiac Status [[id number]] Collected: 23     Updated: 23     QT Interval 392 ms      QTC Interval 463 ms     Narrative:      Test Reason : Other~  Blood Pressure :   */*   mmHG  Vent. Rate :  84 BPM     Atrial Rate :  84 BPM     P-R Int : 134 ms          QRS Dur :  82 ms      QT Int : 392 ms       P-R-T Axes :  54  88  67 degrees     QTc Int : " 463 ms    Normal sinus rhythm  Early repolarization  Normal ECG  When compared with ECG of 2023 22:25,  ST elevation now present in Anterior leads  Confirmed by Claudette Tipton () on 2023 6:07:25 AM    Referred By:            Confirmed By: Claudette Tipton             ALLERGIES: Patient has no known allergies.    Medication Review:   Scheduled Medications:  B-complex with vitamin C, 2 tablet, Oral, Daily  LORazepam, 1.5 mg, Oral, 3 times per day   Followed by  [START ON 2023] LORazepam, 1 mg, Oral, 3 times per day   Followed by  [START ON 2023] LORazepam, 0.5 mg, Oral, 3 times per day  multivitamin with minerals, 1 tablet, Oral, Daily  nicotine, 1 patch, Transdermal, Q24H  thiamine, 100 mg, Oral, Daily         PRN Medications:    acetaminophen    aluminum-magnesium hydroxide-simethicone    benzonatate    benztropine **OR** benztropine    [] cloNIDine **FOLLOWED BY** cloNIDine **FOLLOWED BY** [START ON 2023] cloNIDine **FOLLOWED BY** [START ON 2023] cloNIDine    cyclobenzaprine    dicyclomine    famotidine    hydrALAZINE    hydrOXYzine    ibuprofen    loperamide    [] LORazepam **FOLLOWED BY** LORazepam **FOLLOWED BY** [START ON 2023] LORazepam **FOLLOWED BY** [START ON 2023] LORazepam    magnesium hydroxide    ondansetron    sodium chloride    traZODone   All medications reviewed.    ASSESSMENT & PLAN:      Alcohol use disorder, severe, dependence    Alcohol withdrawal  -Ativan detox  -Thiamine and folate       Opioid use disorder, severe, dependence  -Clonidine detox       Methamphetamine use disorder, severe  -Supportive treatment       Nicotine use disorder  -Encouraged cessation    Special precautions: Special Precautions Level 4 (q30 min checks).    Behavioral Health Treatment Plan and Problem List: I have reviewed and approved the Behavioral Health Treatment Plan and Problem list.  The patient has had a chance to review and agrees with the treatment  plan.    Copied text in portions of this note has been reviewed and is accurate as of 12/19/23         Clinician:  Camron Beckwith MD  12/19/23  10:35 EST

## 2023-12-19 NOTE — PLAN OF CARE
Goal Outcome Evaluation:  Plan of Care Reviewed With: patient  Patient Agreement with Plan of Care: agrees     Progress: improving  Outcome Evaluation: Pt sleepy upon assessment. Pt rates anxiety 4/10, depression 8/10 and craving 0/10 and denies SI/HI/AVH.    Pt appeared to sleep throughout the night.

## 2023-12-20 PROCEDURE — 99232 SBSQ HOSP IP/OBS MODERATE 35: CPT | Performed by: PSYCHIATRY & NEUROLOGY

## 2023-12-20 RX ADMIN — LORAZEPAM 1 MG: 1 TABLET ORAL at 08:30

## 2023-12-20 RX ADMIN — HYDROXYZINE HYDROCHLORIDE 50 MG: 50 TABLET ORAL at 21:17

## 2023-12-20 RX ADMIN — LORAZEPAM 1 MG: 1 TABLET ORAL at 21:20

## 2023-12-20 RX ADMIN — LORAZEPAM 1 MG: 1 TABLET ORAL at 14:32

## 2023-12-20 RX ADMIN — IBUPROFEN 400 MG: 400 TABLET, FILM COATED ORAL at 08:31

## 2023-12-20 RX ADMIN — Medication 100 MG: at 08:30

## 2023-12-20 RX ADMIN — Medication 1 TABLET: at 08:30

## 2023-12-20 RX ADMIN — TRAZODONE HYDROCHLORIDE 50 MG: 50 TABLET ORAL at 21:17

## 2023-12-20 RX ADMIN — Medication 2 TABLET: at 08:30

## 2023-12-20 NOTE — PROGRESS NOTES
Navigator is helping Primary Therapist with discharge planning for patient. Navigator completed the following referrals on this day:     [facility] - [phone]  -Sent 12/18  -Roz states patient is approved medically. She will attempt to complete phone screening later today.  12/19  -[facility] screened patient. Will work on securing bed/ride for Friday and call back.  12/20  -Bed Friday at [facility].  at 12:00pm.       [facility] - [phone]  -Sent 12/18  -Intake received referral. Patient to call and complete phone screening when able.  12/18  -Patient to call and complete phone screening when able.  12/20     [facility] - [phone]  -Sent 12/18  -Intake called. They received referral. Patient to call and complete phone screening when able.  12/18  -Transferred call so patient could complete phone screening.  12/19  -Patient completed phone screening. Treatment team to complete referral when discharge day is known.  12/20

## 2023-12-20 NOTE — PLAN OF CARE
"Goal Outcome Evaluation:  Plan of Care Reviewed With: patient  Patient Agreement with Plan of Care: agrees     Progress: improving  Outcome Evaluation: Pt slighlty more steady today, but still spending a lot of time in his room resting. Pt rates anxiety 4/10 and denies craving/depression. Pt denies SI/HI/AVH and states that appetite is \"better.\"  Pt appeared to sleep throughout the night.       "

## 2023-12-20 NOTE — PLAN OF CARE
Goal Outcome Evaluation:  Plan of Care Reviewed With: patient  Patient Agreement with Plan of Care: agrees     Progress: improving  Outcome Evaluation: Pt has been calm and cooperative, isolates to room for most of the shift. Pt rates anxiety 3, depression 3, states sleep is poor and appetite is good. Pt denies SI/HI/AVH. Pt rates cravings 10, denies withdrawal symptoms however slight tremor observable.

## 2023-12-20 NOTE — PROGRESS NOTES
"INPATIENT PSYCHIATRIC PROGRESS NOTE    Name:  Aniceto Toledo  :  1985  MRN:  [id number]  Visit Number:  [id number]  Length of stay:  3    SUBJECTIVE    CC/Focus of Exam: alcohol use    INTERVAL HISTORY:  The patient reports he is experiencing some headaches and sweats. Sleep and appetite are good and reports no NVD.   Depression rating 4/10  Anxiety rating 5/10  Sleep: good  Withdrawal sx: headaches, sweats.   Cravin/10 for alcohol    Review of Systems   Constitutional:  Positive for diaphoresis.   Respiratory: Negative.     Cardiovascular: Negative.    Gastrointestinal: Negative.    Neurological:  Positive for headaches.       OBJECTIVE    Temp:  [97 °F (36.1 °C)-97.8 °F (36.6 °C)] 97 °F (36.1 °C)  Heart Rate:  [67-94] 88  Resp:  [16-18] 16  BP: (117-127)/(63-73) 117/71    MENTAL STATUS EXAM:  Appearance:Casually dressed, good hygeine.   Cooperation:Cooperative  Psychomotor: No psychomotor agitation/retardation, No EPS, No motor tics  Speech-normal rate, amount.  Mood \"better\"   Affect-congruent, appropriate, stable  Thought Content-goal directed, no delusional material present  Thought process-linear, organized.  Suicidality: No SI  Homicidality: No HI  Perception: No AH/VH  Insight-fair   Judgement-fair    Lab Results (last 24 hours)       ** No results found for the last 24 hours. **               Imaging Results (Last 24 Hours)       ** No results found for the last 24 hours. **               ECG/EMG Results (most recent)       Procedure Component Value Units Date/Time    ECG 12 Lead Other; Baseline Cardiac Status [[id number]] Collected: 23     Updated: 23     QT Interval 392 ms      QTC Interval 463 ms     Narrative:      Test Reason : Other~  Blood Pressure :   */*   mmHG  Vent. Rate :  84 BPM     Atrial Rate :  84 BPM     P-R Int : 134 ms          QRS Dur :  82 ms      QT Int : 392 ms       P-R-T Axes :  54  88  67 degrees     QTc Int : 463 ms    Normal sinus rhythm  Early " repolarization  Normal ECG  When compared with ECG of 2023 22:25,  ST elevation now present in Anterior leads  Confirmed by Claudette Tipton () on 2023 6:07:25 AM    Referred By:            Confirmed By: Claudette Tipton             ALLERGIES: Patient has no known allergies.    Medication Review:   Scheduled Medications:  B-complex with vitamin C, 2 tablet, Oral, Daily  LORazepam, 1 mg, Oral, 3 times per day   Followed by  [START ON 2023] LORazepam, 0.5 mg, Oral, 3 times per day  multivitamin with minerals, 1 tablet, Oral, Daily  nicotine, 1 patch, Transdermal, Q24H  thiamine, 100 mg, Oral, Daily         PRN Medications:    acetaminophen    aluminum-magnesium hydroxide-simethicone    benzonatate    benztropine **OR** benztropine    [] cloNIDine **FOLLOWED BY** [] cloNIDine **FOLLOWED BY** cloNIDine **FOLLOWED BY** [START ON 2023] cloNIDine    cyclobenzaprine    dicyclomine    famotidine    hydrALAZINE    hydrOXYzine    ibuprofen    loperamide    [] LORazepam **FOLLOWED BY** [] LORazepam **FOLLOWED BY** LORazepam **FOLLOWED BY** [START ON 2023] LORazepam    magnesium hydroxide    ondansetron    sodium chloride    traZODone   All medications reviewed.    ASSESSMENT & PLAN:      Alcohol use disorder, severe, dependence    Alcohol withdrawal  -Ativan detox  -Thiamine and folate       Opioid use disorder, severe, dependence  -Clonidine detox       Methamphetamine use disorder, severe  -Supportive treatment       Nicotine use disorder  -Encouraged cessation    Special precautions: Special Precautions Level 4 (q30 min checks).    Behavioral Health Treatment Plan and Problem List: I have reviewed and approved the Behavioral Health Treatment Plan and Problem list.  The patient has had a chance to review and agrees with the treatment plan.    Copied text in portions of this note has been reviewed and is accurate as of 23         Clinician:  Camron Beckwith  MD  12/20/23  10:16 EST

## 2023-12-20 NOTE — DISCHARGE INSTR - APPOINTMENTS
Patient declines.           For patient reference:   [facility]   [address], [place], [place] [address] [phone]

## 2023-12-20 NOTE — PLAN OF CARE
Goal Outcome Evaluation:        Problem: Adult Behavioral Health Plan of Care  Goal: Patient-Specific Goal (Individualization)  Outcome: Ongoing, Progressing  Flowsheets  Taken 12/18/2023 0950 by Geraldine Webster CSW  Patient-Specific Goals (Include Timeframe): Identify 2-3 coping skills, address relapse prevention methods, complete aftercare plans, and deny SI/HI prior to discharge.  Individualized Care Needs: Therapist to offer 1-4 therapy sessions, aftercare planning, safety planning, family education, group therapy, and brief CBT/MI interventions.  Taken 12/18/2023 0942 by Geraldine Webster CSW  Patient Personal Strengths:   resourceful   resilient   motivated for treatment   motivated for recovery   positive educational history   stable living environment   family/social support   coping skills  Patient Vulnerabilities:   substance abuse/addiction   history of unsuccessful treatment   occupational insecurity   poor impulse control  Taken 12/17/2023 1817 by Diane Salazar RN  Anxieties, Fears or Concerns: None verbalized  Goal: Optimized Coping Skills in Response to Life Stressors  Outcome: Ongoing, Progressing  Flowsheets (Taken 12/18/2023 0950)  Optimized Coping Skills in Response to Life Stressors: making progress toward outcome  Intervention: Promote Effective Coping Strategies  Flowsheets (Taken 12/20/2023 0940)  Supportive Measures:   active listening utilized   counseling provided   decision-making supported   goal-setting facilitated   positive reinforcement provided   self-reflection promoted   self-responsibility promoted   verbalization of feelings encouraged   relaxation techniques promoted   self-care encouraged  Goal: Develops/Participates in Therapeutic [place] to Support Successful Transition  Outcome: Ongoing, Progressing  Flowsheets (Taken 12/18/2023 0950)  Develops/Participates in Therapeutic [place] to Support Successful Transition: making progress toward outcome  Intervention:  Foster Therapeutic [place]  Flowsheets (Taken 12/20/2023 0827 by Megan Birmingham RN)  Trust Relationship/Rapport:   care explained   emotional support provided   empathic listening provided   reassurance provided   thoughts/feelings acknowledged  Intervention: Mutually Develop Transition Plan  Flowsheets  Taken 12/20/2023 0939  Transportation Anticipated: agency  Transportation Concerns: no car  Current Discharge Risk: substance use/abuse  Concerns to be Addressed:   substance/tobacco abuse/use   mental health   coping/stress   cognitive/perceptual   discharge planning  Readmission Within the Last 30 Days: no previous admission in last 30 days  Patient/Family Anticipated Services at Transition: rehabilitation services  Patient/Family Anticipates Transition to: inpatient rehabilitation facility  Offered/Gave Vendor List: yes  Taken 12/19/2023 0840  Discharge Coordination/Progress: Patient has [employer] Medicaid. Referrals pending with [facility], and [facility].  Patient's Choice of Community Agency(s): Referals pending with [facility], [facility], and [facility]  Taken 12/18/2023 0950  Outpatient/Agency/Support Group Needs: residential services  Transition Support:   follow-up care discussed   follow-up care coordinated   community resources reviewed   crisis management plan promoted   crisis management plan verbalized  Anticipated Discharge Disposition: residential substance use unit         DATA:  Therapist met with Patient individually this date. Patient agreeable to discuss current treatment progress and discharge concerns.     CLINICAL MANUVERING/INTERVENTIONS:  Assisted Patient in processing session content; acknowledged and normalized Patient’s thoughts, feelings, and concerns by utilizing a person-centered approach in efforts to build appropriate rapport and a positive therapeutic relationship with open and honest communication. Allowed Patient to ventilate regarding current stressors and triggers for  negative emotions and thoughts in a safe nonjudgmental environment with unconditional positive regard, active listening skills, and empathy. Therapist implemented motivational interviewing techniques to assist Patient with exploring personal growth and change and discussed distress tolerance skills, self soothing techniques, and applied cognitive behavioral strategies to facilitate identification of maladaptive patterns of thinking and behavior.Therapist utilized dialectical behavior techniques to teach and model emotional regulation and relaxation methods. Therapist assisted Patient with identifying and implementing healthier coping strategies. Therapist assisted Patient with safety planning; Patient agreed to continue honest communication with Treatment Team while inpatient and identify any SI/HI.  Patient encouraged to seek nearest ER or contact 911 if danger to self or others post discharge.     ASSESSMENT:    Patient continues to receive treatment for detox. Patient reports moderate depression and anxiety, denies SI/HI/AVH. Patient reports experiencing headache and sweats. Patient spends most of his time in his room sleeping. He is agreeable to residential rehab at discharged and has been accepted by [facility].     PLAN:   Patient will continue stabilization. Patient will continue to receive services offered by Treatment Team.     Patient accepted by [facility].

## 2023-12-21 PROCEDURE — 99232 SBSQ HOSP IP/OBS MODERATE 35: CPT | Performed by: PSYCHIATRY & NEUROLOGY

## 2023-12-21 RX ADMIN — TRAZODONE HYDROCHLORIDE 50 MG: 50 TABLET ORAL at 21:16

## 2023-12-21 RX ADMIN — Medication 100 MG: at 08:17

## 2023-12-21 RX ADMIN — LORAZEPAM 0.5 MG: 0.5 TABLET ORAL at 21:16

## 2023-12-21 RX ADMIN — LORAZEPAM 0.5 MG: 0.5 TABLET ORAL at 08:17

## 2023-12-21 RX ADMIN — IBUPROFEN 400 MG: 400 TABLET, FILM COATED ORAL at 08:16

## 2023-12-21 RX ADMIN — Medication 2 TABLET: at 08:17

## 2023-12-21 RX ADMIN — IBUPROFEN 400 MG: 400 TABLET, FILM COATED ORAL at 21:16

## 2023-12-21 RX ADMIN — Medication 1 TABLET: at 08:17

## 2023-12-21 RX ADMIN — LORAZEPAM 0.5 MG: 0.5 TABLET ORAL at 14:10

## 2023-12-21 RX ADMIN — ACETAMINOPHEN 650 MG: 325 TABLET ORAL at 11:18

## 2023-12-21 RX ADMIN — HYDROXYZINE HYDROCHLORIDE 50 MG: 50 TABLET ORAL at 19:02

## 2023-12-21 NOTE — PLAN OF CARE
Goal Outcome Evaluation:  Plan of Care Reviewed With: patient  Patient Agreement with Plan of Care: agrees        Pt slept well, appetite is good, and participated in group and slept well.

## 2023-12-21 NOTE — PROGRESS NOTES
"INPATIENT PSYCHIATRIC PROGRESS NOTE    Name:  Aniceto Toledo  :  1985  MRN:  [id number]  Visit Number:  [id number]  Length of stay:  4    SUBJECTIVE    CC/Focus of Exam: alcohol use    INTERVAL HISTORY:  The patient reports he is feeling some better today but is experiencing ongoing withdrawals   Depression rating 4/10  Anxiety rating 5/10  Sleep: good  Withdrawal sx: headaches, sweats.   Cravin/10 for alcohol at night time.    Review of Systems   Constitutional:  Positive for diaphoresis.   Respiratory: Negative.     Cardiovascular: Negative.    Gastrointestinal: Negative.    Neurological:  Positive for headaches.       OBJECTIVE    Temp:  [97.2 °F (36.2 °C)-97.7 °F (36.5 °C)] 97.2 °F (36.2 °C)  Heart Rate:  [67-93] 75  Resp:  [16] 16  BP: (115-136)/(69-94) 128/69    MENTAL STATUS EXAM:  Appearance:Casually dressed, good hygeine.   Cooperation:Cooperative  Psychomotor: No psychomotor agitation/retardation, No EPS, No motor tics  Speech-normal rate, amount.  Mood \"better\"   Affect-congruent, appropriate, stable  Thought Content-goal directed, no delusional material present  Thought process-linear, organized.  Suicidality: No SI  Homicidality: No HI  Perception: No AH/VH  Insight-fair   Judgement-fair    Lab Results (last 24 hours)       ** No results found for the last 24 hours. **               Imaging Results (Last 24 Hours)       ** No results found for the last 24 hours. **               ECG/EMG Results (most recent)       Procedure Component Value Units Date/Time    ECG 12 Lead Other; Baseline Cardiac Status [[id number]] Collected: 23     Updated: 23     QT Interval 392 ms      QTC Interval 463 ms     Narrative:      Test Reason : Other~  Blood Pressure :   */*   mmHG  Vent. Rate :  84 BPM     Atrial Rate :  84 BPM     P-R Int : 134 ms          QRS Dur :  82 ms      QT Int : 392 ms       P-R-T Axes :  54  88  67 degrees     QTc Int : 463 ms    Normal sinus rhythm  Early " repolarization  Normal ECG  When compared with ECG of 2023 22:25,  ST elevation now present in Anterior leads  Confirmed by Claudette Tipton () on 2023 6:07:25 AM    Referred By:            Confirmed By: Claudette Tipton             ALLERGIES: Patient has no known allergies.    Medication Review:   Scheduled Medications:  B-complex with vitamin C, 2 tablet, Oral, Daily  LORazepam, 0.5 mg, Oral, 3 times per day  multivitamin with minerals, 1 tablet, Oral, Daily  nicotine, 1 patch, Transdermal, Q24H  thiamine, 100 mg, Oral, Daily         PRN Medications:    acetaminophen    aluminum-magnesium hydroxide-simethicone    benzonatate    benztropine **OR** benztropine    [] cloNIDine **FOLLOWED BY** [] cloNIDine **FOLLOWED BY** [] cloNIDine **FOLLOWED BY** cloNIDine    cyclobenzaprine    dicyclomine    famotidine    hydrALAZINE    hydrOXYzine    ibuprofen    loperamide    [] LORazepam **FOLLOWED BY** [] LORazepam **FOLLOWED BY** [] LORazepam **FOLLOWED BY** LORazepam    magnesium hydroxide    ondansetron    sodium chloride    traZODone   All medications reviewed.    ASSESSMENT & PLAN:      Alcohol use disorder, severe, dependence    Alcohol withdrawal  -Continue Ativan detox  -Thiamine and folate       Opioid use disorder, severe, dependence  -Clonidine detox       Methamphetamine use disorder, severe  -Supportive treatment       Nicotine use disorder  -Encouraged cessation    Special precautions: Special Precautions Level 4 (q30 min checks).    Behavioral Health Treatment Plan and Problem List: I have reviewed and approved the Behavioral Health Treatment Plan and Problem list.  The patient has had a chance to review and agrees with the treatment plan.    Copied text in portions of this note has been reviewed and is accurate as of 23         Clinician:  Camron Beckwith MD  23  11:14 EST

## 2023-12-21 NOTE — PLAN OF CARE
Goal Outcome Evaluation:  Plan of Care Reviewed With: patient              Patient has been calm and cooperative this shift. Patient has spent most of shift in his room. Patient reports better sleep and good appetite. Patient reports anxiety 3/10 and depression 9/10. Rates craving 0/10. Patient denies SI/HI/AVH. Pt has had no complaints this shift.

## 2023-12-21 NOTE — PLAN OF CARE
Goal Outcome Evaluation:        Problem: Adult Behavioral Health Plan of Care  Goal: Patient-Specific Goal (Individualization)  Outcome: Ongoing, Progressing  Flowsheets  Taken 12/18/2023 0950 by Geraldine Webster CSW  Patient-Specific Goals (Include Timeframe): Identify 2-3 coping skills, address relapse prevention methods, complete aftercare plans, and deny SI/HI prior to discharge.  Individualized Care Needs: Therapist to offer 1-4 therapy sessions, aftercare planning, safety planning, family education, group therapy, and brief CBT/MI interventions.  Taken 12/18/2023 0942 by Geraldine Webster CSW  Patient Personal Strengths:   resourceful   resilient   motivated for treatment   motivated for recovery   positive educational history   stable living environment   family/social support   coping skills  Patient Vulnerabilities:   substance abuse/addiction   history of unsuccessful treatment   occupational insecurity   poor impulse control  Taken 12/17/2023 1817 by Diane Salazar RN  Anxieties, Fears or Concerns: None verbalized  Goal: Optimized Coping Skills in Response to Life Stressors  Outcome: Ongoing, Progressing  Flowsheets (Taken 12/18/2023 0950)  Optimized Coping Skills in Response to Life Stressors: making progress toward outcome  Intervention: Promote Effective Coping Strategies  Flowsheets (Taken 12/21/2023 0930)  Supportive Measures:   active listening utilized   counseling provided   decision-making supported   goal-setting facilitated   verbalization of feelings encouraged   positive reinforcement provided   self-responsibility promoted   self-reflection promoted   self-care encouraged  Goal: Develops/Participates in Therapeutic [place] to Support Successful Transition  Outcome: Ongoing, Progressing  Flowsheets (Taken 12/18/2023 0950)  Develops/Participates in Therapeutic Premier to Support Successful Transition: making progress toward outcome  Intervention: Foster Therapeutic  [place]  Flowsheets (Taken 12/21/2023 0930)  Trust Relationship/Rapport:   care explained   reassurance provided   choices provided   thoughts/feelings acknowledged   emotional support provided   empathic listening provided   questions answered   questions encouraged  Intervention: Mutually Develop Transition Plan  Flowsheets  Taken 12/21/2023 0988  Discharge Coordination/Progress: Patient has [employer]. Patient has been accepted to [facility]. Agency to provide transportation.  Transportation Anticipated: agency  Transportation Concerns: no car  Current Discharge Risk: substance use/abuse  Concerns to be Addressed:   substance/tobacco abuse/use   mental health   coping/stress   cognitive/perceptual   discharge planning  Readmission Within the Last 30 Days: no previous admission in last 30 days  Patient/Family Anticipated Services at Transition: rehabilitation services  Patient's Choice of Community Agency(s): Accepted to [facility]  Patient/Family Anticipates Transition to: inpatient rehabilitation facility  Offered/Gave Vendor List: yes  Taken 12/18/2023 0950  Outpatient/Agency/Support Group Needs: residential services  Transition Support:   follow-up care discussed   follow-up care coordinated   community resources reviewed   crisis management plan promoted   crisis management plan verbalized  Anticipated Discharge Disposition: residential substance use unit         DATA:  Therapist met with Patient individually this date. Patient agreeable to discuss current treatment progress and discharge concerns.     CLINICAL MANUVERING/INTERVENTIONS:  Assisted Patient in processing session content; acknowledged and normalized Patient’s thoughts, feelings, and concerns by utilizing a person-centered approach in efforts to build appropriate rapport and a positive therapeutic relationship with open and honest communication. Allowed Patient to ventilate regarding current stressors and triggers for negative  emotions and thoughts in a safe nonjudgmental environment with unconditional positive regard, active listening skills, and empathy. Therapist implemented motivational interviewing techniques to assist Patient with exploring personal growth and change and discussed distress tolerance skills, self soothing techniques, and applied cognitive behavioral strategies to facilitate identification of maladaptive patterns of thinking and behavior.Therapist utilized dialectical behavior techniques to teach and model emotional regulation and relaxation methods. Therapist assisted Patient with identifying and implementing healthier coping strategies. Therapist assisted Patient with safety planning; Patient agreed to continue honest communication with Treatment Team while inpatient and identify any SI/HI.  Patient encouraged to seek nearest ER or contact 911 if danger to self or others post discharge.     ASSESSMENT:    Therapist met with patient on this date, continues to receive treatment for detox. Patient reports moderate anxiety and depression, denies SI/HI/AVH. Reports experiencing headache and sweats but starting to feel better overall. Patient is agreeable to admit to [facility] at discharge. Agency will be providing transportation and pickup is scheduled for 12:00pm tomorrow. Healthy coping skills and relapse prevention reviewed with patient.     PLAN:   Patient will continue stabilization. Patient will continue to receive services offered by Treatment Team.     Patient to admit to [facility] at discharge.

## 2023-12-22 VITALS
HEIGHT: 66 IN | SYSTOLIC BLOOD PRESSURE: 113 MMHG | OXYGEN SATURATION: 95 % | DIASTOLIC BLOOD PRESSURE: 67 MMHG | HEART RATE: 54 BPM | BODY MASS INDEX: 22.82 KG/M2 | WEIGHT: 142 LBS | RESPIRATION RATE: 16 BRPM | TEMPERATURE: 97.4 F

## 2023-12-22 PROCEDURE — 99238 HOSP IP/OBS DSCHRG MGMT 30/<: CPT | Performed by: PSYCHIATRY & NEUROLOGY

## 2023-12-22 RX ADMIN — Medication 100 MG: at 08:12

## 2023-12-22 RX ADMIN — CYCLOBENZAPRINE 10 MG: 10 TABLET, FILM COATED ORAL at 08:12

## 2023-12-22 RX ADMIN — Medication 1 TABLET: at 08:12

## 2023-12-22 RX ADMIN — HYDROXYZINE HYDROCHLORIDE 50 MG: 50 TABLET ORAL at 08:12

## 2023-12-22 RX ADMIN — Medication 2 TABLET: at 08:12

## 2023-12-22 NOTE — CASE MANAGEMENT/SOCIAL WORK
Patient Name:  Aniceto Toledo  YOB: 1985  MRN: [id number]  Admit Date:  12/17/2023    Patient expected to discharge to [facility] on this date, pickup scheduled for 12:00. Patient reports improvement in mood and withdrawal symptoms, denies SI/HI/AVH. Healthy coping skills and relapse prevention have been reviewed. Assisted patient with identifying risk factors which would indicate the  need for higher level of care including thoughts to harm self or others and/or self-harming behavior. Encouraged patient to notify facility staff, call [phone] or present to the nearest emergency room should any of these events occur.     11:42  Patient has now decided to return home instead of going to [facility], states he remembered that he has to take care of his aunt who had a stroke. Encouraged patient to follow through with residential treatment, declines but agreeable to leave contact information in appointments in case he were to change his mind. Patient denies needing assistance with transportation. Patient declines for additional aftercare to be arranged.     Electronically signed by:  AAYUSH Ybarra  12/22/23 09:28 EST

## 2023-12-22 NOTE — PAYOR COMM NOTE
"Aniceto Myrick (38 y.o. Male)       Date of Birth   1985    Social Security Number       Address   [address] [place] [address]    Home Phone   [phone]    MRN   [id number]       Episcopalian   None    Marital Status   Legally                             Admission Date   12/17/23    Admission Type   Emergency    Admitting Provider   Kyle Santos MD    Attending Provider       Department, Room/Bed   [facility] ADULT CD, 1039/2S       Discharge Date   12/22/2023    Discharge Disposition   Home or Self Care    Discharge Destination                                 Attending Provider: (none)   Allergies: No Known Allergies    Isolation: None   Infection: None   Code Status: CPR    Ht: 167.6 cm (66\")   Wt: 64.4 kg (142 lb)    Admission Cmt: None   Principal Problem: None                  Active Insurance as of 12/17/2023       Primary Coverage       Payor Plan Insurance Group Employer/Plan Group    [employer] [place] [employer] [place] [employer] [place]        Payor Plan Address Payor Plan Phone Number Payor Plan Fax Number Effective Dates    [address]   12/1/2023 - None Entered    [facility] [id number]         Subscriber Name Subscriber Birth Date Member ID       ANICETO MYRICK 1985 [id number]                     Emergency Contacts        (Rel.) Home Phone Work Phone Mobile Phone    Harmony Rivera (Friend) [phone] -- --          PLEASE ATTACH THIS DISCHARGE INFORMATION TO AUTH. # [id number]     DISCHARGE DATE:  12/22/2023    Discharge Diagnosis:  Active Problems:    Alcohol use disorder, severe, dependence (F10.20)    Alcohol withdrawal (F10.939)    Opioid use disorder, severe, dependence (F11.20)    Methamphetamine use disorder, severe (F15.20)    FOLLOW UP:  Patient declines.               For patient reference:   [facility]   [address] , [place] [address] [phone]    Medication List  You have not been prescribed any " medications.       Geraldine Webster CSW     Case Management     Case Management/Social Work      Addendum     Date of Service: 23  Creation Time: 23       Patient Name:  Aniceto Toledo  YOB: 1985  MRN: [id number]  Admit Date:  2023     Patient expected to discharge to [facility] on this date, pickup scheduled for 12:00. Patient reports improvement in mood and withdrawal symptoms, denies SI/HI/AVH. Healthy coping skills and relapse prevention have been reviewed. Assisted patient with identifying risk factors which would indicate the  need for higher level of care including thoughts to harm self or others and/or self-harming behavior. Encouraged patient to notify facility staff, call [phone] or present to the nearest emergency room should any of these events occur.      11:42  Patient has now decided to return home instead of going to [facility], states he remembered that he has to take care of his aunt who had a stroke. Encouraged patient to follow through with residential treatment, declines but agreeable to leave contact information in appointments in case he were to change his mind. Patient denies needing assistance with transportation. Patient declines for additional aftercare to be arranged.      Electronically signed by:  AAYUSH Ybarra  23 09:28 Camron Winters MD   Physician  Psychiatry     Discharge Summary      Signed     Date of Service: 23  Creation Time: 23     Signed         :  1985  MRN:  [id number]  Visit Number:  [id number]        Date of Admission:2023   Date of Discharge:  2023     Discharge Diagnosis:  Active Problems:    Alcohol use disorder, severe, dependence    Alcohol withdrawal    Opioid use disorder, severe, dependence    Methamphetamine use disorder, severe           Admission Diagnosis:  Polysubstance abuse [F19.10]  Polysubstance abuse [F19.10]               "  HPI  Aniceto Toledo is a 38 y.o. male who was admitted on 12/17/2023 with complaints of alcohol and opioid use and withdrawals.   For details please see H&P dated 12/18/23.      Hospital Course  Patient is a 38 y.o. male presented with alcohol, opioid and meth use and withdrawals. The patient was admitted to the [facility] detox recovery unit for safety, further evaluation and treatment.  The patient was treated with Ativan and clonidine detox regimens and he was able to complete it without any complications.    The patient was also able to take part in individual and group counseling sessions and work on appropriate coping skills.  The patient made steady improvement in his withdrawals and  mood and expressed feeling more positive and hopeful about future. Sleep and appetite were improved.  The day of discharge the patient was calm, cooperative and pleasant. Mood was reported to be good, and denied SI/HI/AVH. Also reported no medication side effects.  .       Mental Status Exam upon discharge:   Mood \"better\"   Affect-congruent, appropriate, stable  Thought Content-goal directed, no delusional material present  Thought process-linear, organized.  Suicidality: No SI  Homicidality: No HI  Perception: No AH/VH     Procedures Performed        Consults:   Consults         No orders found from 11/18/2023 to 12/18/2023.                Pertinent Test Results:           Admission on 12/17/2023   Component Date Value Ref Range Status    Hepatitis B Surface Ag 12/18/2023 Non-Reactive  Non-Reactive Final    Hep A IgM 12/18/2023 Non-Reactive  Non-Reactive Final    Hep B C IgM 12/18/2023 Non-Reactive  Non-Reactive Final    Hepatitis C Ab 12/18/2023 Reactive (A)  Non-Reactive Final    QT Interval 12/17/2023 392  ms Final    QTC Interval 12/17/2023 463  ms Final   Admission on 12/17/2023, Discharged on 12/17/2023   Component Date Value Ref Range Status    Glucose 12/17/2023 171 (H)  65 - 99 mg/dL Final    BUN 12/17/2023 23 " (H)  6 - 20 mg/dL Final    Creatinine 12/17/2023 1.24  0.76 - 1.27 mg/dL Final    Sodium 12/17/2023 138  136 - 145 mmol/L Final    Potassium 12/17/2023 3.7  3.5 - 5.2 mmol/L Final     Slight hemolysis detected by analyzer. Result may be falsely elevated.    Chloride 12/17/2023 99  98 - 107 mmol/L Final    CO2 12/17/2023 21.3 (L)  22.0 - 29.0 mmol/L Final    Calcium 12/17/2023 9.5  8.6 - 10.5 mg/dL Final    Total Protein 12/17/2023 7.9  6.0 - 8.5 g/dL Final    Albumin 12/17/2023 4.8  3.5 - 5.2 g/dL Final    ALT (SGPT) 12/17/2023 43 (H)  1 - 41 U/L Final    AST (SGOT) 12/17/2023 51 (H)  1 - 40 U/L Final    Alkaline Phosphatase 12/17/2023 101  39 - 117 U/L Final    Total Bilirubin 12/17/2023 0.8  0.0 - 1.2 mg/dL Final    Globulin 12/17/2023 3.1  gm/dL Final    A/G Ratio 12/17/2023 1.5  g/dL Final    BUN/Creatinine Ratio 12/17/2023 18.5  7.0 - 25.0 Final    Anion Gap 12/17/2023 17.7 (H)  5.0 - 15.0 mmol/L Final    eGFR 12/17/2023 77.3  >60.0 mL/min/1.73 Final    Color, UA 12/17/2023 Yellow  Yellow, Straw Final    Appearance, UA 12/17/2023 Clear  Clear Final    pH, UA 12/17/2023 5.5  5.0 - 8.0 Final    Specific Gravity, UA 12/17/2023 1.007  1.005 - 1.030 Final    Glucose, UA 12/17/2023 Negative  Negative Final    Ketones, UA 12/17/2023 Negative  Negative Final    Bilirubin, UA 12/17/2023 Negative  Negative Final    Blood, UA 12/17/2023 Negative  Negative Final    Protein, UA 12/17/2023 Negative  Negative Final    Leuk Esterase, UA 12/17/2023 Negative  Negative Final    Nitrite, UA 12/17/2023 Negative  Negative Final    Urobilinogen, UA 12/17/2023 0.2 E.U./dL  0.2 - 1.0 E.U./dL Final    THC, Screen, Urine 12/17/2023 Negative  Negative Final    Phencyclidine (PCP), Urine 12/17/2023 Negative  Negative Final    Cocaine Screen, Urine 12/17/2023 Negative  Negative Final    Methamphetamine, Ur 12/17/2023 Positive (A)  Negative Final    Opiate Screen 12/17/2023 Negative  Negative Final    Amphetamine Screen, Urine 12/17/2023  Positive (A)  Negative Final    Benzodiazepine Screen, Urine 12/17/2023 Negative  Negative Final    Tricyclic Antidepressants Screen 12/17/2023 Negative  Negative Final    Methadone Screen, Urine 12/17/2023 Negative  Negative Final    Barbiturates Screen, Urine 12/17/2023 Negative  Negative Final    Oxycodone Screen, Urine 12/17/2023 Negative  Negative Final    Buprenorphine, Screen, Urine 12/17/2023 Positive (A)  Negative Final    Ethanol 12/17/2023 64 (H)  0 - 10 mg/dL Final    Ethanol % 12/17/2023 0.064  % Final    Magnesium 12/17/2023 2.2  1.6 - 2.6 mg/dL Final    WBC 12/17/2023 9.65  3.40 - 10.80 10*3/mm3 Final    RBC 12/17/2023 4.01 (L)  4.14 - 5.80 10*6/mm3 Final    Hemoglobin 12/17/2023 13.7  13.0 - 17.7 g/dL Final    Hematocrit 12/17/2023 40.3  37.5 - 51.0 % Final    MCV 12/17/2023 100.5 (H)  79.0 - 97.0 fL Final    MCH 12/17/2023 34.2 (H)  26.6 - 33.0 pg Final    MCHC 12/17/2023 34.0  31.5 - 35.7 g/dL Final    RDW 12/17/2023 13.2  12.3 - 15.4 % Final    RDW-SD 12/17/2023 49.6  37.0 - 54.0 fl Final    MPV 12/17/2023 9.0  6.0 - 12.0 fL Final    Platelets 12/17/2023 229  140 - 450 10*3/mm3 Final    Neutrophil % 12/17/2023 70.8  42.7 - 76.0 % Final    Lymphocyte % 12/17/2023 21.3  19.6 - 45.3 % Final    Monocyte % 12/17/2023 5.2  5.0 - 12.0 % Final    Eosinophil % 12/17/2023 1.9  0.3 - 6.2 % Final    Basophil % 12/17/2023 0.6  0.0 - 1.5 % Final    Immature Grans % 12/17/2023 0.2  0.0 - 0.5 % Final    Neutrophils, Absolute 12/17/2023 6.83  1.70 - 7.00 10*3/mm3 Final    Lymphocytes, Absolute 12/17/2023 2.06  0.70 - 3.10 10*3/mm3 Final    Monocytes, Absolute 12/17/2023 0.50  0.10 - 0.90 10*3/mm3 Final    Eosinophils, Absolute 12/17/2023 0.18  0.00 - 0.40 10*3/mm3 Final    Basophils, Absolute 12/17/2023 0.06  0.00 - 0.20 10*3/mm3 Final    Immature Grans, Absolute 12/17/2023 0.02  0.00 - 0.05 10*3/mm3 Final    nRBC 12/17/2023 0.0  0.0 - 0.2 /100 WBC Final    Fentanyl, Urine 12/17/2023 Negative  Negative Final          Condition on Discharge:  improved     Vital Signs  Temp:  [97.3 °F (36.3 °C)-98.2 °F (36.8 °C)] 97.4 °F (36.3 °C)  Heart Rate:  [54-92] 54  Resp:  [16] 16  BP: (111-133)/(67-86) 113/67        Discharge Disposition:  Home or Self Care     Discharge Medications:      Discharge Medications          Stop These Medications       acetaminophen 325 MG tablet  Commonly known as: TYLENOL      buprenorphine-naloxone 8-2 MG per SL tablet  Commonly known as: SUBOXONE      ibuprofen 400 MG tablet  Commonly known as: ADVIL,MOTRIN                   Discharge Diet: Regular      Activity at Discharge: As tolerated      Follow-up Appointments  No future appointments.  Patient was scheduled to go to rehab but he has changed his mind and states he will set up his own aftercare.         Time: I spent  < 30  minutes on this discharge activity which included: face-to-face encounter with the patient, reviewing the data in the system, coordination of the care with the nursing staff as well as consultants, documentation, and entering orders.          Clinician:   Camron Beckwith MD  12/22/23  11:28 EST

## 2023-12-22 NOTE — PLAN OF CARE
Goal Outcome Evaluation:  Plan of Care Reviewed With: patient  Patient Agreement with Plan of Care: agrees     Progress: improving     Pt slept well and appetite is good. Pt reports being anxious but hopeful about discharge to long term treatment. Patient has high motivation and insight with plan for long term sobriety.

## 2023-12-22 NOTE — DISCHARGE SUMMARY
":  1985  MRN:  [id number]  Visit Number:  [id number]      Date of Admission:2023   Date of Discharge:  2023    Discharge Diagnosis:  Active Problems:    Alcohol use disorder, severe, dependence    Alcohol withdrawal    Opioid use disorder, severe, dependence    Methamphetamine use disorder, severe        Admission Diagnosis:  Polysubstance abuse [F19.10]  Polysubstance abuse [F19.10]     HPI  Aniceto Toledo is a 38 y.o. male who was admitted on 2023 with complaints of alcohol and opioid use and withdrawals.   For details please see H&P dated 23.     Hospital Course  Patient is a 38 y.o. male presented with alcohol, opioid and meth use and withdrawals. The patient was admitted to the [facility] detox recovery unit for safety, further evaluation and treatment.  The patient was treated with Ativan and clonidine detox regimens and he was able to complete it without any complications.    The patient was also able to take part in individual and group counseling sessions and work on appropriate coping skills.  The patient made steady improvement in his withdrawals and  mood and expressed feeling more positive and hopeful about future. Sleep and appetite were improved.  The day of discharge the patient was calm, cooperative and pleasant. Mood was reported to be good, and denied SI/HI/AVH. Also reported no medication side effects.  .      Mental Status Exam upon discharge:   Mood \"better\"   Affect-congruent, appropriate, stable  Thought Content-goal directed, no delusional material present  Thought process-linear, organized.  Suicidality: No SI  Homicidality: No HI  Perception: No AH/VH    Procedures Performed         Consults:   Consults       No orders found from 2023 to 2023.            Pertinent Test Results:   Admission on 2023   Component Date Value Ref Range Status    Hepatitis B Surface Ag 2023 Non-Reactive  Non-Reactive Final    Hep A IgM 2023 " Non-Reactive  Non-Reactive Final    Hep B C IgM 12/18/2023 Non-Reactive  Non-Reactive Final    Hepatitis C Ab 12/18/2023 Reactive (A)  Non-Reactive Final    QT Interval 12/17/2023 392  ms Final    QTC Interval 12/17/2023 463  ms Final   Admission on 12/17/2023, Discharged on 12/17/2023   Component Date Value Ref Range Status    Glucose 12/17/2023 171 (H)  65 - 99 mg/dL Final    BUN 12/17/2023 23 (H)  6 - 20 mg/dL Final    Creatinine 12/17/2023 1.24  0.76 - 1.27 mg/dL Final    Sodium 12/17/2023 138  136 - 145 mmol/L Final    Potassium 12/17/2023 3.7  3.5 - 5.2 mmol/L Final    Slight hemolysis detected by analyzer. Result may be falsely elevated.    Chloride 12/17/2023 99  98 - 107 mmol/L Final    CO2 12/17/2023 21.3 (L)  22.0 - 29.0 mmol/L Final    Calcium 12/17/2023 9.5  8.6 - 10.5 mg/dL Final    Total Protein 12/17/2023 7.9  6.0 - 8.5 g/dL Final    Albumin 12/17/2023 4.8  3.5 - 5.2 g/dL Final    ALT (SGPT) 12/17/2023 43 (H)  1 - 41 U/L Final    AST (SGOT) 12/17/2023 51 (H)  1 - 40 U/L Final    Alkaline Phosphatase 12/17/2023 101  39 - 117 U/L Final    Total Bilirubin 12/17/2023 0.8  0.0 - 1.2 mg/dL Final    Globulin 12/17/2023 3.1  gm/dL Final    A/G Ratio 12/17/2023 1.5  g/dL Final    BUN/Creatinine Ratio 12/17/2023 18.5  7.0 - 25.0 Final    Anion Gap 12/17/2023 17.7 (H)  5.0 - 15.0 mmol/L Final    eGFR 12/17/2023 77.3  >60.0 mL/min/1.73 Final    Color, UA 12/17/2023 Yellow  Yellow, Straw Final    Appearance, UA 12/17/2023 Clear  Clear Final    pH, UA 12/17/2023 5.5  5.0 - 8.0 Final    Specific Gravity, UA 12/17/2023 1.007  1.005 - 1.030 Final    Glucose, UA 12/17/2023 Negative  Negative Final    Ketones, UA 12/17/2023 Negative  Negative Final    Bilirubin, UA 12/17/2023 Negative  Negative Final    Blood, UA 12/17/2023 Negative  Negative Final    Protein, UA 12/17/2023 Negative  Negative Final    Leuk Esterase, UA 12/17/2023 Negative  Negative Final    Nitrite, UA 12/17/2023 Negative  Negative Final     Urobilinogen, UA 12/17/2023 0.2 E.U./dL  0.2 - 1.0 E.U./dL Final    THC, Screen, Urine 12/17/2023 Negative  Negative Final    Phencyclidine (PCP), Urine 12/17/2023 Negative  Negative Final    Cocaine Screen, Urine 12/17/2023 Negative  Negative Final    Methamphetamine, Ur 12/17/2023 Positive (A)  Negative Final    Opiate Screen 12/17/2023 Negative  Negative Final    Amphetamine Screen, Urine 12/17/2023 Positive (A)  Negative Final    Benzodiazepine Screen, Urine 12/17/2023 Negative  Negative Final    Tricyclic Antidepressants Screen 12/17/2023 Negative  Negative Final    Methadone Screen, Urine 12/17/2023 Negative  Negative Final    Barbiturates Screen, Urine 12/17/2023 Negative  Negative Final    Oxycodone Screen, Urine 12/17/2023 Negative  Negative Final    Buprenorphine, Screen, Urine 12/17/2023 Positive (A)  Negative Final    Ethanol 12/17/2023 64 (H)  0 - 10 mg/dL Final    Ethanol % 12/17/2023 0.064  % Final    Magnesium 12/17/2023 2.2  1.6 - 2.6 mg/dL Final    WBC 12/17/2023 9.65  3.40 - 10.80 10*3/mm3 Final    RBC 12/17/2023 4.01 (L)  4.14 - 5.80 10*6/mm3 Final    Hemoglobin 12/17/2023 13.7  13.0 - 17.7 g/dL Final    Hematocrit 12/17/2023 40.3  37.5 - 51.0 % Final    MCV 12/17/2023 100.5 (H)  79.0 - 97.0 fL Final    MCH 12/17/2023 34.2 (H)  26.6 - 33.0 pg Final    MCHC 12/17/2023 34.0  31.5 - 35.7 g/dL Final    RDW 12/17/2023 13.2  12.3 - 15.4 % Final    RDW-SD 12/17/2023 49.6  37.0 - 54.0 fl Final    MPV 12/17/2023 9.0  6.0 - 12.0 fL Final    Platelets 12/17/2023 229  140 - 450 10*3/mm3 Final    Neutrophil % 12/17/2023 70.8  42.7 - 76.0 % Final    Lymphocyte % 12/17/2023 21.3  19.6 - 45.3 % Final    Monocyte % 12/17/2023 5.2  5.0 - 12.0 % Final    Eosinophil % 12/17/2023 1.9  0.3 - 6.2 % Final    Basophil % 12/17/2023 0.6  0.0 - 1.5 % Final    Immature Grans % 12/17/2023 0.2  0.0 - 0.5 % Final    Neutrophils, Absolute 12/17/2023 6.83  1.70 - 7.00 10*3/mm3 Final    Lymphocytes, Absolute 12/17/2023 2.06  0.70  - 3.10 10*3/mm3 Final    Monocytes, Absolute 12/17/2023 0.50  0.10 - 0.90 10*3/mm3 Final    Eosinophils, Absolute 12/17/2023 0.18  0.00 - 0.40 10*3/mm3 Final    Basophils, Absolute 12/17/2023 0.06  0.00 - 0.20 10*3/mm3 Final    Immature Grans, Absolute 12/17/2023 0.02  0.00 - 0.05 10*3/mm3 Final    nRBC 12/17/2023 0.0  0.0 - 0.2 /100 WBC Final    Fentanyl, Urine 12/17/2023 Negative  Negative Final        Condition on Discharge:  improved    Vital Signs  Temp:  [97.3 °F (36.3 °C)-98.2 °F (36.8 °C)] 97.4 °F (36.3 °C)  Heart Rate:  [54-92] 54  Resp:  [16] 16  BP: (111-133)/(67-86) 113/67      Discharge Disposition:  Home or Self Care    Discharge Medications:     Discharge Medications        Stop These Medications      acetaminophen 325 MG tablet  Commonly known as: TYLENOL     buprenorphine-naloxone 8-2 MG per SL tablet  Commonly known as: SUBOXONE     ibuprofen 400 MG tablet  Commonly known as: ADVIL,MOTRIN              Discharge Diet: Regular     Activity at Discharge: As tolerated     Follow-up Appointments  No future appointments.  Patient was scheduled to go to rehab but he has changed his mind and states he will set up his own aftercare.       Time: I spent  < 30  minutes on this discharge activity which included: face-to-face encounter with the patient, reviewing the data in the system, coordination of the care with the nursing staff as well as consultants, documentation, and entering orders.        Clinician:   Camron Beckwith MD  12/22/23  11:28 EST

## 2023-12-22 NOTE — CASE MANAGEMENT/SOCIAL WORK
..Bridge Session  Date: 12/22/2023   Time: 1140    Data:  Reason for Inpatient Admission: Polysubstance abuse    Follow up:       For patient reference:   [facility]   [address], [place] [address] [phone]      Coping Skills to Utilize: Patient encouraged to engage in physical activity, mindfulness, negative thought redirection and engaging their support system.    Crisis Safety Plan:  Support System to utilize and contact numbers: patients wife and patient has contact number    Educated on crisis hotline numbers (yes/no): Yes    Was the Patient made aware of contact information for the following: community mental health centers, crisis stabilization programs, residential programs, , etc (yes/no): Yes    Will transportation be a barrier (yes/no): No  If so, explain solution(s) to resolve barrier: n/a    How and where will the patient obtain prescribed medications: Patients medications were filled today by [facility] and brought to patient.  The cost of the medication was covered by insurance.    Assessment: Patient is denying suicidal ideation today and denying homicidal ideation today.  Patient reports decrease in Depression today and decrease in Anxiety today.  Patient is future oriented and ready for discharge.    Plan:    Discussed the importance of follow up treatment for continuity of care. The Patient was able to verbalize understanding and commitment to the individualized aftercare and crisis safety plan.      Suly Chino LCSW

## 2024-01-22 ENCOUNTER — HOSPITAL ENCOUNTER (EMERGENCY)
Facility: HOSPITAL | Age: 39
Discharge: HOME | DRG: 897 | End: 2024-01-23
Attending: EMERGENCY MEDICINE | Admitting: EMERGENCY MEDICINE
Payer: COMMERCIAL

## 2024-01-22 DIAGNOSIS — F10.90 ALCOHOL PROBLEM DRINKING: Primary | ICD-10-CM

## 2024-01-22 LAB
ALBUMIN SERPL-MCNC: 4.2 G/DL (ref 3.5–5.2)
ALBUMIN/GLOB SERPL: 1.4 G/DL
ALP SERPL-CCNC: 115 U/L (ref 39–117)
ALT SERPL W P-5'-P-CCNC: 237 U/L (ref 1–41)
AMPHET+METHAMPHET UR QL: NEGATIVE
AMPHETAMINES UR QL: POSITIVE
ANION GAP SERPL CALCULATED.3IONS-SCNC: 12.5 MMOL/L (ref 5–15)
AST SERPL-CCNC: 618 U/L (ref 1–40)
BARBITURATES UR QL SCN: NEGATIVE
BASOPHILS # BLD AUTO: 0.06 10*3/MM3 (ref 0–0.2)
BASOPHILS NFR BLD AUTO: 1.2 % (ref 0–1.5)
BENZODIAZ UR QL SCN: NEGATIVE
BILIRUB SERPL-MCNC: 0.5 MG/DL (ref 0–1.2)
BILIRUB UR QL STRIP: NEGATIVE
BUN SERPL-MCNC: 16 MG/DL (ref 6–20)
BUN/CREAT SERPL: 20.8 (ref 7–25)
BUPRENORPHINE SERPL-MCNC: POSITIVE NG/ML
CALCIUM SPEC-SCNC: 8.8 MG/DL (ref 8.6–10.5)
CANNABINOIDS SERPL QL: NEGATIVE
CHLORIDE SERPL-SCNC: 105 MMOL/L (ref 98–107)
CLARITY UR: CLEAR
CO2 SERPL-SCNC: 25.5 MMOL/L (ref 22–29)
COCAINE UR QL: NEGATIVE
COLOR UR: YELLOW
CREAT SERPL-MCNC: 0.77 MG/DL (ref 0.76–1.27)
DEPRECATED RDW RBC AUTO: 51.6 FL (ref 37–54)
EGFRCR SERPLBLD CKD-EPI 2021: 119 ML/MIN/1.73
EOSINOPHIL # BLD AUTO: 0.28 10*3/MM3 (ref 0–0.4)
EOSINOPHIL NFR BLD AUTO: 5.4 % (ref 0.3–6.2)
ERYTHROCYTE [DISTWIDTH] IN BLOOD BY AUTOMATED COUNT: 13.6 % (ref 12.3–15.4)
ETHANOL BLD-MCNC: 337 MG/DL (ref 0–10)
ETHANOL UR QL: 0.34 %
FENTANYL UR-MCNC: NEGATIVE NG/ML
GLOBULIN UR ELPH-MCNC: 2.9 GM/DL
GLUCOSE SERPL-MCNC: 128 MG/DL (ref 65–99)
GLUCOSE UR STRIP-MCNC: NEGATIVE MG/DL
HAV IGM SERPL QL IA: ABNORMAL
HBV CORE IGM SERPL QL IA: ABNORMAL
HBV SURFACE AG SERPL QL IA: ABNORMAL
HCT VFR BLD AUTO: 43 % (ref 37.5–51)
HCV AB SER DONR QL: REACTIVE
HGB BLD-MCNC: 14.3 G/DL (ref 13–17.7)
HGB UR QL STRIP.AUTO: NEGATIVE
HOLD SPECIMEN: NORMAL
HOLD SPECIMEN: NORMAL
IMM GRANULOCYTES # BLD AUTO: 0.02 10*3/MM3 (ref 0–0.05)
IMM GRANULOCYTES NFR BLD AUTO: 0.4 % (ref 0–0.5)
KETONES UR QL STRIP: ABNORMAL
LEUKOCYTE ESTERASE UR QL STRIP.AUTO: NEGATIVE
LYMPHOCYTES # BLD AUTO: 1.7 10*3/MM3 (ref 0.7–3.1)
LYMPHOCYTES NFR BLD AUTO: 33.1 % (ref 19.6–45.3)
MAGNESIUM SERPL-MCNC: 2.2 MG/DL (ref 1.6–2.6)
MCH RBC QN AUTO: 33.9 PG (ref 26.6–33)
MCHC RBC AUTO-ENTMCNC: 33.3 G/DL (ref 31.5–35.7)
MCV RBC AUTO: 101.9 FL (ref 79–97)
METHADONE UR QL SCN: NEGATIVE
MONOCYTES # BLD AUTO: 0.34 10*3/MM3 (ref 0.1–0.9)
MONOCYTES NFR BLD AUTO: 6.6 % (ref 5–12)
NEUTROPHILS NFR BLD AUTO: 2.74 10*3/MM3 (ref 1.7–7)
NEUTROPHILS NFR BLD AUTO: 53.3 % (ref 42.7–76)
NITRITE UR QL STRIP: NEGATIVE
NRBC BLD AUTO-RTO: 0 /100 WBC (ref 0–0.2)
OPIATES UR QL: NEGATIVE
OXYCODONE UR QL SCN: NEGATIVE
PCP UR QL SCN: NEGATIVE
PH UR STRIP.AUTO: 5.5 [PH] (ref 5–8)
PLATELET # BLD AUTO: 216 10*3/MM3 (ref 140–450)
PMV BLD AUTO: 9.9 FL (ref 6–12)
POTASSIUM SERPL-SCNC: 4.2 MMOL/L (ref 3.5–5.2)
PROT SERPL-MCNC: 7.1 G/DL (ref 6–8.5)
PROT UR QL STRIP: NEGATIVE
RBC # BLD AUTO: 4.22 10*6/MM3 (ref 4.14–5.8)
SODIUM SERPL-SCNC: 143 MMOL/L (ref 136–145)
SP GR UR STRIP: 1.03 (ref 1–1.03)
TRICYCLICS UR QL SCN: NEGATIVE
TSH SERPL DL<=0.05 MIU/L-ACNC: 1.67 UIU/ML (ref 0.27–4.2)
UROBILINOGEN UR QL STRIP: ABNORMAL
WBC NRBC COR # BLD AUTO: 5.14 10*3/MM3 (ref 3.4–10.8)
WHOLE BLOOD HOLD COAG: NORMAL
WHOLE BLOOD HOLD SPECIMEN: NORMAL

## 2024-01-22 PROCEDURE — 25810000003 SODIUM CHLORIDE 0.9 % SOLUTION: Performed by: NURSE PRACTITIONER

## 2024-01-22 PROCEDURE — 80074 ACUTE HEPATITIS PANEL: CPT | Performed by: NURSE PRACTITIONER

## 2024-01-22 PROCEDURE — 99283 EMERGENCY DEPT VISIT LOW MDM: CPT

## 2024-01-22 PROCEDURE — 96365 THER/PROPH/DIAG IV INF INIT: CPT

## 2024-01-22 PROCEDURE — 82077 ASSAY SPEC XCP UR&BREATH IA: CPT | Performed by: PHYSICIAN ASSISTANT

## 2024-01-22 PROCEDURE — 80053 COMPREHEN METABOLIC PANEL: CPT | Performed by: PHYSICIAN ASSISTANT

## 2024-01-22 PROCEDURE — 84443 ASSAY THYROID STIM HORMONE: CPT | Performed by: PHYSICIAN ASSISTANT

## 2024-01-22 PROCEDURE — 96375 TX/PRO/DX INJ NEW DRUG ADDON: CPT

## 2024-01-22 PROCEDURE — 36415 COLL VENOUS BLD VENIPUNCTURE: CPT

## 2024-01-22 PROCEDURE — 85025 COMPLETE CBC W/AUTO DIFF WBC: CPT | Performed by: PHYSICIAN ASSISTANT

## 2024-01-22 PROCEDURE — 83735 ASSAY OF MAGNESIUM: CPT | Performed by: PHYSICIAN ASSISTANT

## 2024-01-22 PROCEDURE — 25010000002 THIAMINE PER 100 MG: Performed by: NURSE PRACTITIONER

## 2024-01-22 PROCEDURE — 81003 URINALYSIS AUTO W/O SCOPE: CPT | Performed by: PHYSICIAN ASSISTANT

## 2024-01-22 PROCEDURE — 80307 DRUG TEST PRSMV CHEM ANLYZR: CPT | Performed by: PHYSICIAN ASSISTANT

## 2024-01-22 RX ORDER — THIAMINE HYDROCHLORIDE 100 MG/ML
200 INJECTION, SOLUTION INTRAMUSCULAR; INTRAVENOUS ONCE
Status: COMPLETED | OUTPATIENT
Start: 2024-01-22 | End: 2024-01-22

## 2024-01-22 RX ORDER — SODIUM CHLORIDE 0.9 % (FLUSH) 0.9 %
10 SYRINGE (ML) INJECTION AS NEEDED
Status: DISCONTINUED | OUTPATIENT
Start: 2024-01-22 | End: 2024-01-23 | Stop reason: HOSPADM

## 2024-01-22 RX ORDER — ALBUTEROL SULFATE 90 UG/1
2 AEROSOL, METERED RESPIRATORY (INHALATION) EVERY 4 HOURS PRN
COMMUNITY

## 2024-01-22 RX ORDER — SODIUM CHLORIDE 9 MG/ML
1000 INJECTION, SOLUTION INTRAVENOUS ONCE
Status: COMPLETED | OUTPATIENT
Start: 2024-01-22 | End: 2024-01-22

## 2024-01-22 RX ORDER — NAPROXEN 500 MG/1
500 TABLET ORAL 2 TIMES DAILY WITH MEALS
COMMUNITY
End: 2024-01-25

## 2024-01-22 RX ORDER — BUPRENORPHINE HYDROCHLORIDE AND NALOXONE HYDROCHLORIDE DIHYDRATE 8; 2 MG/1; MG/1
1.5 TABLET SUBLINGUAL DAILY
COMMUNITY

## 2024-01-22 RX ORDER — NALOXONE HYDROCHLORIDE 4 MG/.1ML
1 SPRAY NASAL AS NEEDED
COMMUNITY

## 2024-01-22 RX ADMIN — SODIUM CHLORIDE 1000 ML: 9 INJECTION, SOLUTION INTRAVENOUS at 17:42

## 2024-01-22 RX ADMIN — FOLIC ACID 1 MG: 5 INJECTION, SOLUTION INTRAMUSCULAR; INTRAVENOUS; SUBCUTANEOUS at 17:40

## 2024-01-22 RX ADMIN — THIAMINE HYDROCHLORIDE 200 MG: 100 INJECTION, SOLUTION INTRAMUSCULAR; INTRAVENOUS at 17:40

## 2024-01-22 NOTE — ED NOTES
MEDICAL SCREENING:    Reason for Visit: detox    Patient initially seen in triage.  The patient was advised further evaluation and diagnostic testing will be needed, some of the treatment and testing will be initiated in the lobby in order to begin the process.  The patient will be returned to the waiting area for the time being and possibly be re-assessed by a subsequent ED provider.  The patient will be brought back to the treatment area in as timely manner as possible.       Sri Pradhan PA  01/22/24 1542

## 2024-01-23 VITALS
DIASTOLIC BLOOD PRESSURE: 94 MMHG | OXYGEN SATURATION: 93 % | RESPIRATION RATE: 18 BRPM | SYSTOLIC BLOOD PRESSURE: 139 MMHG | HEART RATE: 94 BPM | TEMPERATURE: 97.5 F | BODY MASS INDEX: 22.5 KG/M2 | WEIGHT: 140 LBS | HEIGHT: 66 IN

## 2024-01-23 LAB
ETHANOL BLD-MCNC: 103 MG/DL (ref 0–10)
ETHANOL BLD-MCNC: 116 MG/DL (ref 0–10)
ETHANOL BLD-MCNC: 221 MG/DL (ref 0–10)
ETHANOL UR QL: 0.1 %
ETHANOL UR QL: 0.12 %
ETHANOL UR QL: 0.22 %

## 2024-01-23 PROCEDURE — 82077 ASSAY SPEC XCP UR&BREATH IA: CPT | Performed by: NURSE PRACTITIONER

## 2024-01-23 PROCEDURE — 96375 TX/PRO/DX INJ NEW DRUG ADDON: CPT

## 2024-01-23 PROCEDURE — 82077 ASSAY SPEC XCP UR&BREATH IA: CPT | Performed by: PHYSICIAN ASSISTANT

## 2024-01-23 PROCEDURE — 25010000002 LORAZEPAM PER 2 MG: Performed by: PHYSICIAN ASSISTANT

## 2024-01-23 RX ORDER — LORAZEPAM 1 MG/1
1 TABLET ORAL ONCE
Status: COMPLETED | OUTPATIENT
Start: 2024-01-23 | End: 2024-01-23

## 2024-01-23 RX ORDER — NICOTINE 21 MG/24HR
1 PATCH, TRANSDERMAL 24 HOURS TRANSDERMAL ONCE
Status: DISCONTINUED | OUTPATIENT
Start: 2024-01-23 | End: 2024-01-23 | Stop reason: HOSPADM

## 2024-01-23 RX ORDER — LORAZEPAM 2 MG/ML
1 INJECTION INTRAMUSCULAR ONCE
Status: COMPLETED | OUTPATIENT
Start: 2024-01-23 | End: 2024-01-23

## 2024-01-23 RX ADMIN — LORAZEPAM 1 MG: 1 TABLET ORAL at 07:51

## 2024-01-23 RX ADMIN — LORAZEPAM 1 MG: 2 INJECTION INTRAMUSCULAR; INTRAVENOUS at 00:27

## 2024-01-23 RX ADMIN — NICOTINE TRANSDERMAL SYSTEM 1 PATCH: 21 PATCH, EXTENDED RELEASE TRANSDERMAL at 00:27

## 2024-01-23 NOTE — NURSING NOTE
Spoke to pt regarding detox process. Pt advises that he came to the hospital last night with his significant other. He states that she is currently in the intake department to be evaluated for detox as well. Pt states that if him and his significant other cannot both be admitted to this detox unit then he would like to be discharged. Pt adamantly denies SI/HI/AVH.   Spoke to Dr. Nicholas who states that the pt cannot be admitted to the same unit as his significant other and to discharge the pt with outpatient resources. [id number]  Outpatient resources- homeless shelters and coed rehab centers- provided to pt. Pt states that once discharged, he will wait in the waiting room to call facilities.   ER Provider and Primary RN Fabi craven.

## 2024-01-23 NOTE — ED PROVIDER NOTES
"Subjective   History of Present Illness  Patient is a 28-year-old male with significant past medical history positive for GERD, alcohol abuse, alcoholism, hypertension and previous seizures due to alcohol withdrawal presenting to the ER for psychiatric evaluation for alcohol detox.  Patient drank approximately a pint of vodka prior to arrival.  Patient reports daily drinking for several years.  Patient is also a daily smoker.  Patient denies any SI or HI.  Patient denies any hallucinations at this time.  Patient denies any additional symptoms at this time.    History provided by:  Patient   used: No        Review of Systems   Constitutional: Negative.  Negative for fever.   HENT: Negative.     Respiratory: Negative.     Cardiovascular: Negative.  Negative for chest pain.   Gastrointestinal: Negative.  Negative for abdominal pain.   Endocrine: Negative.    Genitourinary: Negative.  Negative for dysuria.   Skin: Negative.    Neurological: Negative.    Psychiatric/Behavioral: Negative.     All other systems reviewed and are negative.      Past Medical History:   Diagnosis Date    Acid reflux     Alcohol abuse     Alcoholism     Hepatitis C     HTN (hypertension)     Seizure due to alcohol withdrawal     July 2023 per pt       No Known Allergies    Past Surgical History:   Procedure Laterality Date    FOOT SURGERY Left     HIP FUSION Left     age 24    SKIN GRAFT Right     \"left face, left shoulder, bilat hands, left foot\" age 8       Family History   Problem Relation Age of Onset    Alcohol abuse Mother     Alcohol abuse Father        Social History     Socioeconomic History    Marital status: Legally      Spouse name: Gabe Toledo - estefani    Number of children: 1    Years of education: 12    Highest education level: High school graduate   Tobacco Use    Smoking status: Every Day     Packs/day: 0.50     Years: 25.00     Additional pack years: 0.00     Total pack years: 12.50     " "Types: Cigarettes    Smokeless tobacco: Never   Vaping Use    Vaping Use: Former   Substance and Sexual Activity    Alcohol use: Yes     Comment: \"1/2 gallon of vodka daily\"    Drug use: Yes     Types: Marijuana, Amphetamines, Methamphetamines, Other, Benzodiazepines, Oxycodone, Hydrocodone     Comment: alcohol, suboxone, opiates    Sexual activity: Yes     Partners: Female           Objective   Physical Exam  Vitals and nursing note reviewed.   Constitutional:       General: He is not in acute distress.     Appearance: He is well-developed. He is not diaphoretic.   HENT:      Head: Normocephalic and atraumatic.      Right Ear: External ear normal.      Left Ear: External ear normal.      Nose: Nose normal.   Eyes:      Conjunctiva/sclera: Conjunctivae normal.      Pupils: Pupils are equal, round, and reactive to light.   Neck:      Vascular: No JVD.      Trachea: No tracheal deviation.   Cardiovascular:      Rate and Rhythm: Normal rate and regular rhythm.      Heart sounds: Normal heart sounds. No murmur heard.  Pulmonary:      Effort: Pulmonary effort is normal. No respiratory distress.      Breath sounds: Normal breath sounds. No wheezing.   Abdominal:      General: Bowel sounds are normal.      Palpations: Abdomen is soft.      Tenderness: There is no abdominal tenderness.   Musculoskeletal:         General: No deformity. Normal range of motion.      Cervical back: Normal range of motion and neck supple.   Skin:     General: Skin is warm and dry.      Coloration: Skin is not pale.      Findings: No erythema or rash.   Neurological:      Mental Status: He is alert and oriented to person, place, and time.      Cranial Nerves: No cranial nerve deficit.   Psychiatric:         Speech: Speech is slurred.         Behavior: Behavior normal.         Thought Content: Thought content normal.         Procedures       Results for orders placed or performed during the hospital encounter of 01/22/24   Comprehensive Metabolic " Panel    Specimen: Arm, Right; Blood   Result Value Ref Range    Glucose 128 (H) 65 - 99 mg/dL    BUN 16 6 - 20 mg/dL    Creatinine 0.77 0.76 - 1.27 mg/dL    Sodium 143 136 - 145 mmol/L    Potassium 4.2 3.5 - 5.2 mmol/L    Chloride 105 98 - 107 mmol/L    CO2 25.5 22.0 - 29.0 mmol/L    Calcium 8.8 8.6 - 10.5 mg/dL    Total Protein 7.1 6.0 - 8.5 g/dL    Albumin 4.2 3.5 - 5.2 g/dL    ALT (SGPT) 237 (H) 1 - 41 U/L    AST (SGOT) 618 (H) 1 - 40 U/L    Alkaline Phosphatase 115 39 - 117 U/L    Total Bilirubin 0.5 0.0 - 1.2 mg/dL    Globulin 2.9 gm/dL    A/G Ratio 1.4 g/dL    BUN/Creatinine Ratio 20.8 7.0 - 25.0    Anion Gap 12.5 5.0 - 15.0 mmol/L    eGFR 119.0 >60.0 mL/min/1.73   Urinalysis With Microscopic If Indicated (No Culture) - Urine, Clean Catch    Specimen: Urine, Clean Catch   Result Value Ref Range    Color, UA Yellow Yellow, Straw    Appearance, UA Clear Clear    pH, UA 5.5 5.0 - 8.0    Specific Gravity, UA 1.028 1.005 - 1.030    Glucose, UA Negative Negative    Ketones, UA Trace (A) Negative    Bilirubin, UA Negative Negative    Blood, UA Negative Negative    Protein, UA Negative Negative    Leuk Esterase, UA Negative Negative    Nitrite, UA Negative Negative    Urobilinogen, UA 0.2 E.U./dL 0.2 - 1.0 E.U./dL   Urine Drug Screen - Urine, Clean Catch    Specimen: Urine, Clean Catch   Result Value Ref Range    THC, Screen, Urine Negative Negative    Phencyclidine (PCP), Urine Negative Negative    Cocaine Screen, Urine Negative Negative    Methamphetamine, Ur Positive (A) Negative    Opiate Screen Negative Negative    Amphetamine Screen, Urine Negative Negative    Benzodiazepine Screen, Urine Negative Negative    Tricyclic Antidepressants Screen Negative Negative    Methadone Screen, Urine Negative Negative    Barbiturates Screen, Urine Negative Negative    Oxycodone Screen, Urine Negative Negative    Buprenorphine, Screen, Urine Positive (A) Negative   Ethanol    Specimen: Arm, Right; Blood   Result Value Ref  Range    Ethanol 337 (H) 0 - 10 mg/dL    Ethanol % 0.337 %   Magnesium    Specimen: Arm, Right; Blood   Result Value Ref Range    Magnesium 2.2 1.6 - 2.6 mg/dL   TSH    Specimen: Arm, Right; Blood   Result Value Ref Range    TSH 1.670 0.270 - 4.200 uIU/mL   CBC Auto Differential    Specimen: Arm, Right; Blood   Result Value Ref Range    WBC 5.14 3.40 - 10.80 10*3/mm3    RBC 4.22 4.14 - 5.80 10*6/mm3    Hemoglobin 14.3 13.0 - 17.7 g/dL    Hematocrit 43.0 37.5 - 51.0 %    .9 (H) 79.0 - 97.0 fL    MCH 33.9 (H) 26.6 - 33.0 pg    MCHC 33.3 31.5 - 35.7 g/dL    RDW 13.6 12.3 - 15.4 %    RDW-SD 51.6 37.0 - 54.0 fl    MPV 9.9 6.0 - 12.0 fL    Platelets 216 140 - 450 10*3/mm3    Neutrophil % 53.3 42.7 - 76.0 %    Lymphocyte % 33.1 19.6 - 45.3 %    Monocyte % 6.6 5.0 - 12.0 %    Eosinophil % 5.4 0.3 - 6.2 %    Basophil % 1.2 0.0 - 1.5 %    Immature Grans % 0.4 0.0 - 0.5 %    Neutrophils, Absolute 2.74 1.70 - 7.00 10*3/mm3    Lymphocytes, Absolute 1.70 0.70 - 3.10 10*3/mm3    Monocytes, Absolute 0.34 0.10 - 0.90 10*3/mm3    Eosinophils, Absolute 0.28 0.00 - 0.40 10*3/mm3    Basophils, Absolute 0.06 0.00 - 0.20 10*3/mm3    Immature Grans, Absolute 0.02 0.00 - 0.05 10*3/mm3    nRBC 0.0 0.0 - 0.2 /100 WBC   Hepatitis Panel, Acute    Specimen: Arm, Left; Blood   Result Value Ref Range    Hepatitis B Surface Ag Non-Reactive Non-Reactive    Hep A IgM Non-Reactive Non-Reactive    Hep B C IgM Non-Reactive Non-Reactive    Hepatitis C Ab Reactive (A) Non-Reactive   Fentanyl, Urine - Urine, Clean Catch    Specimen: Urine, Clean Catch   Result Value Ref Range    Fentanyl, Urine Negative Negative   Ethanol    Specimen: Arm, Left; Blood   Result Value Ref Range    Ethanol 221 (H) 0 - 10 mg/dL    Ethanol % 0.221 %   Ethanol    Specimen: Arm, Right; Blood   Result Value Ref Range    Ethanol 116 (H) 0 - 10 mg/dL    Ethanol % 0.116 %   Ethanol    Specimen: Arm, Right; Blood   Result Value Ref Range    Ethanol 103 (H) 0 - 10 mg/dL     Ethanol % 0.103 %   Green Top (Gel)   Result Value Ref Range    Extra Tube Hold for add-ons.    Lavender Top   Result Value Ref Range    Extra Tube hold for add-on    Gold Top - SST   Result Value Ref Range    Extra Tube Hold for add-ons.    Light Blue Top   Result Value Ref Range    Extra Tube Hold for add-ons.         ED Course  ED Course as of 01/25/24 1022   Mon Jan 22, 2024   1831 Ethanol(!): 337 [SM]   2123 Hepatitis C Ab(!): Reactive [SM]   Tue Jan 23, 2024   0741 Patient does not wish to stay for alcohol treatment. [RB]      ED Course User Index  [RB] Sammy Hernández II PA  [SM] Christa Borja APRN                                             Medical Decision Making  Patient is a 28-year-old male with significant past medical history positive for GERD, alcohol abuse, alcoholism, hypertension and previous seizures due to alcohol withdrawal presenting to the ER for psychiatric evaluation for alcohol detox.  Patient drank approximately a pint of vodka prior to arrival.  Patient reports daily drinking for several years.  Patient is also a daily smoker.  Patient denies any SI or HI.  Patient denies any hallucinations at this time.  Patient denies any additional symptoms at this time.    Problems Addressed:  Alcohol problem drinking: complicated acute illness or injury    Amount and/or Complexity of Data Reviewed  Labs: ordered. Decision-making details documented in ED Course.    Risk  Prescription drug management.        Final diagnoses:   Alcohol problem drinking       ED Disposition  ED Disposition       ED Disposition   Discharge    Condition   Stable    Comment   --               Checo Rivera MD  [address] [place] [address]  [address]  [place] [address]  [phone]    Schedule an appointment as soon as possible for a visit            Medication List      No changes were made to your prescriptions during this visit.            Christa Borja APRN  01/25/24 1023

## 2024-01-24 ENCOUNTER — HOSPITAL ENCOUNTER (EMERGENCY)
Facility: HOSPITAL | Age: 39
Discharge: STILL ADMITTED OR ADMINISTRATIVE | DRG: 897 | End: 2024-01-25
Attending: EMERGENCY MEDICINE
Payer: COMMERCIAL

## 2024-01-24 DIAGNOSIS — F10.20 ALCOHOLISM: Primary | ICD-10-CM

## 2024-01-24 LAB
ALBUMIN SERPL-MCNC: 3.8 G/DL (ref 3.5–5.2)
ALBUMIN/GLOB SERPL: 1.3 G/DL
ALP SERPL-CCNC: 142 U/L (ref 39–117)
ALT SERPL W P-5'-P-CCNC: 213 U/L (ref 1–41)
AMPHET+METHAMPHET UR QL: NEGATIVE
AMPHETAMINES UR QL: NEGATIVE
ANION GAP SERPL CALCULATED.3IONS-SCNC: 15.1 MMOL/L (ref 5–15)
AST SERPL-CCNC: 289 U/L (ref 1–40)
BARBITURATES UR QL SCN: NEGATIVE
BASOPHILS # BLD AUTO: 0.03 10*3/MM3 (ref 0–0.2)
BASOPHILS NFR BLD AUTO: 0.7 % (ref 0–1.5)
BENZODIAZ UR QL SCN: NEGATIVE
BILIRUB SERPL-MCNC: 0.7 MG/DL (ref 0–1.2)
BILIRUB UR QL STRIP: NEGATIVE
BUN SERPL-MCNC: 10 MG/DL (ref 6–20)
BUN/CREAT SERPL: 15.4 (ref 7–25)
BUPRENORPHINE SERPL-MCNC: POSITIVE NG/ML
CALCIUM SPEC-SCNC: 8.9 MG/DL (ref 8.6–10.5)
CANNABINOIDS SERPL QL: NEGATIVE
CHLORIDE SERPL-SCNC: 101 MMOL/L (ref 98–107)
CLARITY UR: CLEAR
CO2 SERPL-SCNC: 23.9 MMOL/L (ref 22–29)
COCAINE UR QL: NEGATIVE
COLOR UR: YELLOW
CREAT SERPL-MCNC: 0.65 MG/DL (ref 0.76–1.27)
DEPRECATED RDW RBC AUTO: 50 FL (ref 37–54)
EGFRCR SERPLBLD CKD-EPI 2021: 125.2 ML/MIN/1.73
EOSINOPHIL # BLD AUTO: 0.19 10*3/MM3 (ref 0–0.4)
EOSINOPHIL NFR BLD AUTO: 4.5 % (ref 0.3–6.2)
ERYTHROCYTE [DISTWIDTH] IN BLOOD BY AUTOMATED COUNT: 13.4 % (ref 12.3–15.4)
ETHANOL BLD-MCNC: 76 MG/DL (ref 0–10)
ETHANOL UR QL: 0.08 %
FENTANYL UR-MCNC: NEGATIVE NG/ML
GLOBULIN UR ELPH-MCNC: 2.9 GM/DL
GLUCOSE SERPL-MCNC: 215 MG/DL (ref 65–99)
GLUCOSE UR STRIP-MCNC: NEGATIVE MG/DL
HCT VFR BLD AUTO: 40.3 % (ref 37.5–51)
HGB BLD-MCNC: 13.5 G/DL (ref 13–17.7)
HGB UR QL STRIP.AUTO: NEGATIVE
HOLD SPECIMEN: NORMAL
HOLD SPECIMEN: NORMAL
IMM GRANULOCYTES # BLD AUTO: 0.01 10*3/MM3 (ref 0–0.05)
IMM GRANULOCYTES NFR BLD AUTO: 0.2 % (ref 0–0.5)
KETONES UR QL STRIP: NEGATIVE
LEUKOCYTE ESTERASE UR QL STRIP.AUTO: NEGATIVE
LYMPHOCYTES # BLD AUTO: 2.23 10*3/MM3 (ref 0.7–3.1)
LYMPHOCYTES NFR BLD AUTO: 53.1 % (ref 19.6–45.3)
MAGNESIUM SERPL-MCNC: 1.7 MG/DL (ref 1.6–2.6)
MCH RBC QN AUTO: 33.8 PG (ref 26.6–33)
MCHC RBC AUTO-ENTMCNC: 33.5 G/DL (ref 31.5–35.7)
MCV RBC AUTO: 100.8 FL (ref 79–97)
METHADONE UR QL SCN: NEGATIVE
MONOCYTES # BLD AUTO: 0.34 10*3/MM3 (ref 0.1–0.9)
MONOCYTES NFR BLD AUTO: 8.1 % (ref 5–12)
NEUTROPHILS NFR BLD AUTO: 1.4 10*3/MM3 (ref 1.7–7)
NEUTROPHILS NFR BLD AUTO: 33.4 % (ref 42.7–76)
NITRITE UR QL STRIP: NEGATIVE
NRBC BLD AUTO-RTO: 0 /100 WBC (ref 0–0.2)
OPIATES UR QL: NEGATIVE
OXYCODONE UR QL SCN: NEGATIVE
PCP UR QL SCN: NEGATIVE
PH UR STRIP.AUTO: 7 [PH] (ref 5–8)
PLATELET # BLD AUTO: 165 10*3/MM3 (ref 140–450)
PMV BLD AUTO: 9.2 FL (ref 6–12)
POTASSIUM SERPL-SCNC: 3.9 MMOL/L (ref 3.5–5.2)
PROT SERPL-MCNC: 6.7 G/DL (ref 6–8.5)
PROT UR QL STRIP: NEGATIVE
RBC # BLD AUTO: 4 10*6/MM3 (ref 4.14–5.8)
SODIUM SERPL-SCNC: 140 MMOL/L (ref 136–145)
SP GR UR STRIP: 1.01 (ref 1–1.03)
TRICYCLICS UR QL SCN: NEGATIVE
TSH SERPL DL<=0.05 MIU/L-ACNC: 1.61 UIU/ML (ref 0.27–4.2)
UROBILINOGEN UR QL STRIP: NORMAL
WBC NRBC COR # BLD AUTO: 4.2 10*3/MM3 (ref 3.4–10.8)
WHOLE BLOOD HOLD COAG: NORMAL
WHOLE BLOOD HOLD SPECIMEN: NORMAL

## 2024-01-24 PROCEDURE — 80307 DRUG TEST PRSMV CHEM ANLYZR: CPT | Performed by: STUDENT IN AN ORGANIZED HEALTH CARE EDUCATION/TRAINING PROGRAM

## 2024-01-24 PROCEDURE — 82077 ASSAY SPEC XCP UR&BREATH IA: CPT | Performed by: STUDENT IN AN ORGANIZED HEALTH CARE EDUCATION/TRAINING PROGRAM

## 2024-01-24 PROCEDURE — 83735 ASSAY OF MAGNESIUM: CPT | Performed by: STUDENT IN AN ORGANIZED HEALTH CARE EDUCATION/TRAINING PROGRAM

## 2024-01-24 PROCEDURE — 36415 COLL VENOUS BLD VENIPUNCTURE: CPT

## 2024-01-24 PROCEDURE — 80053 COMPREHEN METABOLIC PANEL: CPT | Performed by: STUDENT IN AN ORGANIZED HEALTH CARE EDUCATION/TRAINING PROGRAM

## 2024-01-24 PROCEDURE — 84443 ASSAY THYROID STIM HORMONE: CPT | Performed by: EMERGENCY MEDICINE

## 2024-01-24 PROCEDURE — 85025 COMPLETE CBC W/AUTO DIFF WBC: CPT | Performed by: STUDENT IN AN ORGANIZED HEALTH CARE EDUCATION/TRAINING PROGRAM

## 2024-01-24 PROCEDURE — 99285 EMERGENCY DEPT VISIT HI MDM: CPT

## 2024-01-24 PROCEDURE — 81003 URINALYSIS AUTO W/O SCOPE: CPT | Performed by: STUDENT IN AN ORGANIZED HEALTH CARE EDUCATION/TRAINING PROGRAM

## 2024-01-24 RX ORDER — TRAZODONE HYDROCHLORIDE 50 MG/1
50 TABLET ORAL NIGHTLY
COMMUNITY
End: 2024-01-25

## 2024-01-24 RX ORDER — LORAZEPAM 2 MG/1
2 TABLET ORAL ONCE
Status: COMPLETED | OUTPATIENT
Start: 2024-01-24 | End: 2024-01-24

## 2024-01-24 RX ORDER — PROMETHAZINE HYDROCHLORIDE AND CODEINE PHOSPHATE 6.25; 1 MG/5ML; MG/5ML
5 SYRUP ORAL EVERY 4 HOURS PRN
COMMUNITY
End: 2024-01-25

## 2024-01-24 RX ORDER — CLONIDINE HYDROCHLORIDE 0.1 MG/1
0.1 TABLET ORAL ONCE
Status: COMPLETED | OUTPATIENT
Start: 2024-01-25 | End: 2024-01-25

## 2024-01-24 RX ORDER — LORAZEPAM 2 MG/1
2 TABLET ORAL ONCE
Status: COMPLETED | OUTPATIENT
Start: 2024-01-25 | End: 2024-01-25

## 2024-01-24 RX ORDER — CLONIDINE HYDROCHLORIDE 0.1 MG/1
0.1 TABLET ORAL ONCE
Status: COMPLETED | OUTPATIENT
Start: 2024-01-24 | End: 2024-01-24

## 2024-01-24 RX ADMIN — LORAZEPAM 2 MG: 2 TABLET ORAL at 23:07

## 2024-01-24 RX ADMIN — CLONIDINE HYDROCHLORIDE 0.1 MG: 0.1 TABLET ORAL at 23:07

## 2024-01-25 ENCOUNTER — HOSPITAL ENCOUNTER (INPATIENT)
Facility: HOSPITAL | Age: 39
LOS: 4 days | Discharge: NURSING, REHAB OR LONG-TERM CARE | DRG: 897 | End: 2024-01-29
Attending: PSYCHIATRY & NEUROLOGY | Admitting: PSYCHIATRY & NEUROLOGY
Payer: COMMERCIAL

## 2024-01-25 VITALS
RESPIRATION RATE: 16 BRPM | HEART RATE: 80 BPM | BODY MASS INDEX: 22.5 KG/M2 | OXYGEN SATURATION: 98 % | DIASTOLIC BLOOD PRESSURE: 100 MMHG | HEIGHT: 66 IN | WEIGHT: 140 LBS | TEMPERATURE: 98.3 F | SYSTOLIC BLOOD PRESSURE: 142 MMHG

## 2024-01-25 DIAGNOSIS — F10.20 ALCOHOL USE DISORDER, SEVERE, DEPENDENCE: Primary | ICD-10-CM

## 2024-01-25 PROBLEM — F10.10 ALCOHOL ABUSE: Status: ACTIVE | Noted: 2024-01-25

## 2024-01-25 PROBLEM — F17.200 NICOTINE USE DISORDER: Status: ACTIVE | Noted: 2024-01-25

## 2024-01-25 LAB
QT INTERVAL: 424 MS
QTC INTERVAL: 444 MS

## 2024-01-25 PROCEDURE — 93010 ELECTROCARDIOGRAM REPORT: CPT | Performed by: INTERNAL MEDICINE

## 2024-01-25 PROCEDURE — 93005 ELECTROCARDIOGRAM TRACING: CPT | Performed by: PSYCHIATRY & NEUROLOGY

## 2024-01-25 PROCEDURE — 99222 1ST HOSP IP/OBS MODERATE 55: CPT | Performed by: PSYCHIATRY & NEUROLOGY

## 2024-01-25 RX ORDER — NALOXONE HYDROCHLORIDE 4 MG/.1ML
1 SPRAY NASAL AS NEEDED
Status: CANCELLED | OUTPATIENT
Start: 2024-01-25

## 2024-01-25 RX ORDER — TRAZODONE HYDROCHLORIDE 50 MG/1
50 TABLET ORAL NIGHTLY PRN
Status: DISCONTINUED | OUTPATIENT
Start: 2024-01-25 | End: 2024-01-29 | Stop reason: HOSPADM

## 2024-01-25 RX ORDER — LORAZEPAM 2 MG/1
2 TABLET ORAL
Status: COMPLETED | OUTPATIENT
Start: 2024-01-26 | End: 2024-01-26

## 2024-01-25 RX ORDER — ONDANSETRON 4 MG/1
4 TABLET, ORALLY DISINTEGRATING ORAL EVERY 6 HOURS PRN
Status: DISCONTINUED | OUTPATIENT
Start: 2024-01-25 | End: 2024-01-29 | Stop reason: HOSPADM

## 2024-01-25 RX ORDER — LORAZEPAM 2 MG/1
2 TABLET ORAL
Status: DISCONTINUED | OUTPATIENT
Start: 2024-01-25 | End: 2024-01-25

## 2024-01-25 RX ORDER — LORAZEPAM 0.5 MG/1
0.5 TABLET ORAL EVERY 4 HOURS PRN
Status: DISCONTINUED | OUTPATIENT
Start: 2024-01-29 | End: 2024-01-29 | Stop reason: HOSPADM

## 2024-01-25 RX ORDER — NICOTINE 21 MG/24HR
1 PATCH, TRANSDERMAL 24 HOURS TRANSDERMAL
Status: DISCONTINUED | OUTPATIENT
Start: 2024-01-25 | End: 2024-01-29 | Stop reason: HOSPADM

## 2024-01-25 RX ORDER — IBUPROFEN 400 MG/1
400 TABLET ORAL EVERY 6 HOURS PRN
Status: DISCONTINUED | OUTPATIENT
Start: 2024-01-25 | End: 2024-01-29 | Stop reason: HOSPADM

## 2024-01-25 RX ORDER — MULTIPLE VITAMINS W/ MINERALS TAB 9MG-400MCG
1 TAB ORAL DAILY
Status: DISCONTINUED | OUTPATIENT
Start: 2024-01-25 | End: 2024-01-29 | Stop reason: HOSPADM

## 2024-01-25 RX ORDER — BENZTROPINE MESYLATE 1 MG/ML
1 INJECTION INTRAMUSCULAR; INTRAVENOUS ONCE AS NEEDED
Status: DISCONTINUED | OUTPATIENT
Start: 2024-01-25 | End: 2024-01-29 | Stop reason: HOSPADM

## 2024-01-25 RX ORDER — ALUMINA, MAGNESIA, AND SIMETHICONE 2400; 2400; 240 MG/30ML; MG/30ML; MG/30ML
15 SUSPENSION ORAL EVERY 6 HOURS PRN
Status: DISCONTINUED | OUTPATIENT
Start: 2024-01-25 | End: 2024-01-29 | Stop reason: HOSPADM

## 2024-01-25 RX ORDER — LORAZEPAM 2 MG/1
2 TABLET ORAL
Status: DISCONTINUED | OUTPATIENT
Start: 2024-01-25 | End: 2024-01-29 | Stop reason: HOSPADM

## 2024-01-25 RX ORDER — ALBUTEROL SULFATE 90 UG/1
2 AEROSOL, METERED RESPIRATORY (INHALATION) EVERY 4 HOURS PRN
Status: DISCONTINUED | OUTPATIENT
Start: 2024-01-25 | End: 2024-01-29 | Stop reason: HOSPADM

## 2024-01-25 RX ORDER — LORAZEPAM 2 MG/1
2 TABLET ORAL EVERY 4 HOURS PRN
Status: ACTIVE | OUTPATIENT
Start: 2024-01-26 | End: 2024-01-27

## 2024-01-25 RX ORDER — FAMOTIDINE 20 MG/1
20 TABLET, FILM COATED ORAL 2 TIMES DAILY PRN
Status: DISCONTINUED | OUTPATIENT
Start: 2024-01-25 | End: 2024-01-29 | Stop reason: HOSPADM

## 2024-01-25 RX ORDER — HYDRALAZINE HYDROCHLORIDE 25 MG/1
50 TABLET, FILM COATED ORAL ONCE
Status: COMPLETED | OUTPATIENT
Start: 2024-01-25 | End: 2024-01-25

## 2024-01-25 RX ORDER — MULTIVITAMIN WITH IRON
2 TABLET ORAL DAILY
Status: DISCONTINUED | OUTPATIENT
Start: 2024-01-25 | End: 2024-01-29 | Stop reason: HOSPADM

## 2024-01-25 RX ORDER — BENZONATATE 100 MG/1
100 CAPSULE ORAL 3 TIMES DAILY PRN
Status: DISCONTINUED | OUTPATIENT
Start: 2024-01-25 | End: 2024-01-29 | Stop reason: HOSPADM

## 2024-01-25 RX ORDER — ECHINACEA PURPUREA EXTRACT 125 MG
2 TABLET ORAL AS NEEDED
Status: DISCONTINUED | OUTPATIENT
Start: 2024-01-25 | End: 2024-01-29 | Stop reason: HOSPADM

## 2024-01-25 RX ORDER — HYDROXYZINE 50 MG/1
50 TABLET, FILM COATED ORAL EVERY 6 HOURS PRN
Status: DISCONTINUED | OUTPATIENT
Start: 2024-01-25 | End: 2024-01-29 | Stop reason: HOSPADM

## 2024-01-25 RX ORDER — LORAZEPAM 0.5 MG/1
0.5 TABLET ORAL
Status: DISCONTINUED | OUTPATIENT
Start: 2024-01-29 | End: 2024-01-29 | Stop reason: HOSPADM

## 2024-01-25 RX ORDER — LORAZEPAM 1 MG/1
1 TABLET ORAL
Status: COMPLETED | OUTPATIENT
Start: 2024-01-28 | End: 2024-01-28

## 2024-01-25 RX ORDER — LOPERAMIDE HYDROCHLORIDE 2 MG/1
2 CAPSULE ORAL
Status: DISCONTINUED | OUTPATIENT
Start: 2024-01-25 | End: 2024-01-29 | Stop reason: HOSPADM

## 2024-01-25 RX ORDER — BENZTROPINE MESYLATE 1 MG/1
2 TABLET ORAL ONCE AS NEEDED
Status: DISCONTINUED | OUTPATIENT
Start: 2024-01-25 | End: 2024-01-29 | Stop reason: HOSPADM

## 2024-01-25 RX ORDER — LORAZEPAM 1 MG/1
1 TABLET ORAL EVERY 4 HOURS PRN
Status: DISPENSED | OUTPATIENT
Start: 2024-01-28 | End: 2024-01-29

## 2024-01-25 RX ORDER — BUPRENORPHINE HYDROCHLORIDE AND NALOXONE HYDROCHLORIDE DIHYDRATE 8; 2 MG/1; MG/1
1.5 TABLET SUBLINGUAL DAILY
Status: DISCONTINUED | OUTPATIENT
Start: 2024-01-25 | End: 2024-01-29 | Stop reason: HOSPADM

## 2024-01-25 RX ADMIN — TRAZODONE HYDROCHLORIDE 50 MG: 50 TABLET ORAL at 22:15

## 2024-01-25 RX ADMIN — LORAZEPAM 2 MG: 2 TABLET ORAL at 13:32

## 2024-01-25 RX ADMIN — LORAZEPAM 2 MG: 2 TABLET ORAL at 09:51

## 2024-01-25 RX ADMIN — LORAZEPAM 2 MG: 2 TABLET ORAL at 16:54

## 2024-01-25 RX ADMIN — LORAZEPAM 2 MG: 2 TABLET ORAL at 20:59

## 2024-01-25 RX ADMIN — LORAZEPAM 2 MG: 2 TABLET ORAL at 07:04

## 2024-01-25 RX ADMIN — Medication 100 MG: at 08:46

## 2024-01-25 RX ADMIN — LORAZEPAM 2 MG: 2 TABLET ORAL at 05:54

## 2024-01-25 RX ADMIN — METOPROLOL TARTRATE 50 MG: 25 TABLET, FILM COATED ORAL at 22:15

## 2024-01-25 RX ADMIN — HYDROXYZINE HYDROCHLORIDE 50 MG: 50 TABLET ORAL at 03:57

## 2024-01-25 RX ADMIN — LORAZEPAM 2 MG: 2 TABLET ORAL at 15:52

## 2024-01-25 RX ADMIN — LORAZEPAM 2 MG: 2 TABLET ORAL at 11:03

## 2024-01-25 RX ADMIN — LORAZEPAM 2 MG: 2 TABLET ORAL at 08:46

## 2024-01-25 RX ADMIN — LORAZEPAM 2 MG: 2 TABLET ORAL at 12:20

## 2024-01-25 RX ADMIN — Medication 2 TABLET: at 08:46

## 2024-01-25 RX ADMIN — HYDROXYZINE HYDROCHLORIDE 50 MG: 50 TABLET ORAL at 22:15

## 2024-01-25 RX ADMIN — BUPRENORPHINE HYDROCHLORIDE AND NALOXONE HYDROCHLORIDE DIHYDRATE 1.5 TABLET: 8; 2 TABLET SUBLINGUAL at 08:45

## 2024-01-25 RX ADMIN — Medication 1 TABLET: at 08:45

## 2024-01-25 RX ADMIN — HYDRALAZINE HYDROCHLORIDE 50 MG: 25 TABLET ORAL at 01:54

## 2024-01-25 RX ADMIN — HYDROXYZINE HYDROCHLORIDE 50 MG: 50 TABLET ORAL at 14:48

## 2024-01-25 RX ADMIN — LORAZEPAM 2 MG: 2 TABLET ORAL at 14:48

## 2024-01-25 RX ADMIN — CLONIDINE HYDROCHLORIDE 0.1 MG: 0.1 TABLET ORAL at 00:04

## 2024-01-25 RX ADMIN — LORAZEPAM 2 MG: 2 TABLET ORAL at 00:04

## 2024-01-25 RX ADMIN — LORAZEPAM 2 MG: 2 TABLET ORAL at 03:57

## 2024-01-25 NOTE — ED PROVIDER NOTES
"Subjective   History of Present Illness  Patient is a 38-year-old male who presents requesting admission for alcohol detox.  He states that normally he drinks half a gallon of vodka every day, today he has only had 1/5 of vodka and his last drink was this morning.  He denies suicidal homicidal ideations, auditory or visual hallucinations, chest pain, shortness of breath, abdominal pain, vomiting, focal numbness or weakness, other symptoms or other complaints.  [facility], and they sent him here for medical detox.      Review of Systems   All other systems reviewed and are negative.      Past Medical History:   Diagnosis Date    Acid reflux     Alcohol abuse     Alcoholism     Hepatitis C     HTN (hypertension)     Seizure due to alcohol withdrawal     July 2023 per pt       No Known Allergies    Past Surgical History:   Procedure Laterality Date    FOOT SURGERY Left     HIP FUSION Left     age 24    SKIN GRAFT Right     \"left face, left shoulder, bilat hands, left foot\" age 8       Family History   Problem Relation Age of Onset    Alcohol abuse Mother     Alcohol abuse Father        Social History     Socioeconomic History    Marital status: Legally      Spouse name: Gabe Toledo - estefani    Number of children: 1    Years of education: 12    Highest education level: High school graduate   Tobacco Use    Smoking status: Every Day     Packs/day: 0.50     Years: 25.00     Additional pack years: 0.00     Total pack years: 12.50     Types: Cigarettes    Smokeless tobacco: Never   Vaping Use    Vaping Use: Former   Substance and Sexual Activity    Alcohol use: Yes     Comment: \"1/2 gallon of vodka daily\"    Drug use: Yes     Types: Marijuana, Amphetamines, Methamphetamines, Other, Benzodiazepines, Oxycodone, Hydrocodone     Comment: alcohol, suboxone, opiates    Sexual activity: Yes     Partners: Female           Objective   Physical Exam  Vitals and nursing note " reviewed.   Constitutional:       Appearance: Normal appearance. He is well-developed. He is not toxic-appearing or diaphoretic.      Comments: Well-developed well-nourished male, awake, alert, well-oriented.  He is anxious and tremulous.   HENT:      Head: Normocephalic and atraumatic.   Eyes:      General: No scleral icterus.     Pupils: Pupils are equal, round, and reactive to light.   Neck:      Trachea: No tracheal deviation.   Cardiovascular:      Rate and Rhythm: Normal rate and regular rhythm.   Pulmonary:      Effort: Pulmonary effort is normal. No respiratory distress.      Breath sounds: Normal breath sounds.   Chest:      Chest wall: No tenderness.   Abdominal:      General: Bowel sounds are normal.      Palpations: Abdomen is soft.      Tenderness: There is no abdominal tenderness. There is no guarding or rebound.   Musculoskeletal:         General: No tenderness. Normal range of motion.      Cervical back: Normal range of motion and neck supple. No rigidity or tenderness.      Right lower leg: No edema.      Left lower leg: No edema.   Skin:     General: Skin is warm and dry.      Capillary Refill: Capillary refill takes less than 2 seconds.      Coloration: Skin is not pale.   Neurological:      General: No focal deficit present.      Mental Status: He is alert and oriented to person, place, and time.      GCS: GCS eye subscore is 4. GCS verbal subscore is 5. GCS motor subscore is 6.      Motor: No abnormal muscle tone.   Psychiatric:         Behavior: Behavior normal.         Procedures  Results for orders placed or performed during the hospital encounter of 01/24/24   Ethanol    Specimen: Arm, Left; Blood   Result Value Ref Range    Ethanol 76 (H) 0 - 10 mg/dL    Ethanol % 0.076 %   Comprehensive Metabolic Panel    Specimen: Arm, Left; Blood   Result Value Ref Range    Glucose 215 (H) 65 - 99 mg/dL    BUN 10 6 - 20 mg/dL    Creatinine 0.65 (L) 0.76 - 1.27 mg/dL    Sodium 140 136 - 145 mmol/L     Potassium 3.9 3.5 - 5.2 mmol/L    Chloride 101 98 - 107 mmol/L    CO2 23.9 22.0 - 29.0 mmol/L    Calcium 8.9 8.6 - 10.5 mg/dL    Total Protein 6.7 6.0 - 8.5 g/dL    Albumin 3.8 3.5 - 5.2 g/dL    ALT (SGPT) 213 (H) 1 - 41 U/L    AST (SGOT) 289 (H) 1 - 40 U/L    Alkaline Phosphatase 142 (H) 39 - 117 U/L    Total Bilirubin 0.7 0.0 - 1.2 mg/dL    Globulin 2.9 gm/dL    A/G Ratio 1.3 g/dL    BUN/Creatinine Ratio 15.4 7.0 - 25.0    Anion Gap 15.1 (H) 5.0 - 15.0 mmol/L    eGFR 125.2 >60.0 mL/min/1.73   Magnesium    Specimen: Arm, Left; Blood   Result Value Ref Range    Magnesium 1.7 1.6 - 2.6 mg/dL   Urinalysis With Microscopic If Indicated (No Culture) - Urine, Clean Catch    Specimen: Urine, Clean Catch   Result Value Ref Range    Color, UA Yellow Yellow, Straw    Appearance, UA Clear Clear    pH, UA 7.0 5.0 - 8.0    Specific Gravity, UA 1.015 1.005 - 1.030    Glucose, UA Negative Negative    Ketones, UA Negative Negative    Bilirubin, UA Negative Negative    Blood, UA Negative Negative    Protein, UA Negative Negative    Leuk Esterase, UA Negative Negative    Nitrite, UA Negative Negative    Urobilinogen, UA 1.0 E.U./dL 0.2 - 1.0 E.U./dL   Urine Drug Screen - Urine, Clean Catch    Specimen: Urine, Clean Catch   Result Value Ref Range    THC, Screen, Urine Negative Negative    Phencyclidine (PCP), Urine Negative Negative    Cocaine Screen, Urine Negative Negative    Methamphetamine, Ur Negative Negative    Opiate Screen Negative Negative    Amphetamine Screen, Urine Negative Negative    Benzodiazepine Screen, Urine Negative Negative    Tricyclic Antidepressants Screen Negative Negative    Methadone Screen, Urine Negative Negative    Barbiturates Screen, Urine Negative Negative    Oxycodone Screen, Urine Negative Negative    Buprenorphine, Screen, Urine Positive (A) Negative   Fentanyl, Urine - Urine, Clean Catch    Specimen: Urine, Clean Catch   Result Value Ref Range    Fentanyl, Urine Negative Negative   CBC Auto  Differential    Specimen: Arm, Left; Blood   Result Value Ref Range    WBC 4.20 3.40 - 10.80 10*3/mm3    RBC 4.00 (L) 4.14 - 5.80 10*6/mm3    Hemoglobin 13.5 13.0 - 17.7 g/dL    Hematocrit 40.3 37.5 - 51.0 %    .8 (H) 79.0 - 97.0 fL    MCH 33.8 (H) 26.6 - 33.0 pg    MCHC 33.5 31.5 - 35.7 g/dL    RDW 13.4 12.3 - 15.4 %    RDW-SD 50.0 37.0 - 54.0 fl    MPV 9.2 6.0 - 12.0 fL    Platelets 165 140 - 450 10*3/mm3    Neutrophil % 33.4 (L) 42.7 - 76.0 %    Lymphocyte % 53.1 (H) 19.6 - 45.3 %    Monocyte % 8.1 5.0 - 12.0 %    Eosinophil % 4.5 0.3 - 6.2 %    Basophil % 0.7 0.0 - 1.5 %    Immature Grans % 0.2 0.0 - 0.5 %    Neutrophils, Absolute 1.40 (L) 1.70 - 7.00 10*3/mm3    Lymphocytes, Absolute 2.23 0.70 - 3.10 10*3/mm3    Monocytes, Absolute 0.34 0.10 - 0.90 10*3/mm3    Eosinophils, Absolute 0.19 0.00 - 0.40 10*3/mm3    Basophils, Absolute 0.03 0.00 - 0.20 10*3/mm3    Immature Grans, Absolute 0.01 0.00 - 0.05 10*3/mm3    nRBC 0.0 0.0 - 0.2 /100 WBC   TSH    Specimen: Arm, Left; Blood   Result Value Ref Range    TSH 1.610 0.270 - 4.200 uIU/mL   Green Top (Gel)   Result Value Ref Range    Extra Tube Hold for add-ons.    Lavender Top   Result Value Ref Range    Extra Tube hold for add-on    Gold Top - SST   Result Value Ref Range    Extra Tube Hold for add-ons.    Light Blue Top   Result Value Ref Range    Extra Tube Hold for add-ons.               ED Course  ED Course as of 01/25/24 0120   Wed Jan 24, 2024   2356 Still tremulous, blood pressure still elevated, heart rate in the 90s.  I ordered a second dose of Ativan and clonidine. [CM]   6206 Patient has been evaluated by psychiatry intake and is being admitted to the detox unit/discharged to behavioral health. [CM]   Thu Jan 25, 2024   0118 Psychiatry is requesting hydralazine 50 mg p.o. for patient's blood pressure.  This is ordered. [CM]      ED Course User Index  [CM] Scottie Jose MD                                             Medical Decision  Making  Problems Addressed:  Alcoholism: complicated acute illness or injury    Amount and/or Complexity of Data Reviewed  Labs: ordered. Decision-making details documented in ED Course.    Risk  Prescription drug management.  Decision regarding hospitalization.        Final diagnoses:   Alcoholism       ED Disposition  ED Disposition       ED Disposition   DC/Transfer to Behavioral Health    Condition   Stable    Comment   --               Please note that portions of this note were completed with a voice recognition program.                Scottie Jose MD  01/25/24 0000       Scottie Jose MD  01/25/24 0120

## 2024-01-25 NOTE — NURSING NOTE
Pt was drowsy after taking doses of Ativan and was afraid he might fall if he got up to sit for his Suboxone dose to dissolve and scanned pills and put aside until I could go sit in his room and watch him until dose dissolved.When gave pt his Suboxone 2 tabs were given instead of 1.5 tabs.Melba Kay,Lead RN,FarrahRN (primary nurse) and Mary Kay Miguel,Supervisor notified of extra half of tab given.Pharmacy notified also.

## 2024-01-25 NOTE — NURSING NOTE
Dr. Jose on unit and provided medical clearance. Dr. Jose request RN to enter order for clonidine 0.1mg oral and Ativan 2mg oral now. RBVO x2.

## 2024-01-25 NOTE — PLAN OF CARE
"Goal Outcome Evaluation:              Outcome Evaluation: Pt presented to detox unit stating he came in because, \"I was DTing off alcohol\" and \"because all I do is drink.\" Pt states he drinks half a gallon of liquor a day for a month. Pt states his last drink was at 9 am 1/24/24. Pt states he uses suboxone and alcohol daily but denies other substance use. Pt states [facility]. Pt denies cravings stating, \"im just sleepy and having cold sweats.\" Pt denies SI,HI,AVH. Pt reports good appetite and poor sleep.                               "

## 2024-01-25 NOTE — NURSING NOTE
"37 y/o presents from \"[facility]\" for alcohol detox. Pt advised that he checked into the facility earlier this date. Reports that he drink 1/2 gallon of vodka daily with last intake around 9am.    Pt reports past history of Alcohol DT's/Seizures, with last one reported \"July 2023\".     Reports Meth - \"occasional use - last used 3-4 days ago\".     Reports Benzo use - \"rare use - last used 3-4 days ago\".     Reports THC \"every now and then - last use couple of weeks ago\".    Reports prescribed Suboxone 16mg strip daily. Prescribed by [facility]. Last dose taken 1-23-24.      Denies SI/HI/AVH.    COWS 7  CIWA 6    Depression 3/10  Anxiety 7/10    Appetite fair  Sleep poor    Glucose 215  Alk Phos 142        Ethanol 76    UDS  Buprenorphine    Pt advised he has history of htn and hasn't taken his medicine in unknown amount of time.     Pt reports that his \"girlfriend\" is also in intake and was brought for detox by [facility]. Pt and girlfriend were at this facility two days ago and elected to not stay because they couldn't both be admitted to detox unit at the same time.     While in intake pt received -   Ativan 2mg oral @ 2307, and 0004  Clonidine 0.1mg oral @ 2307, and 0004          "

## 2024-01-25 NOTE — NURSING NOTE
Spoke with Libra jane RN - bed assignment 43A. Lead aware that pt presented with his girlfriend and Dr. DEVANTE Sampson aware and decision to offer admission to Mr. Toledo based on criteria.

## 2024-01-25 NOTE — H&P
"      INITIAL PSYCHIATRIC HISTORY & PHYSICAL    Patient Identification:  Name:  Aniceto Tloedo  Age:  38 y.o.  Sex:  male  :  1985  MRN:  [id number]   Visit Number:  [id number]  Primary Care Physician:  Checo Rivera MD    SUBJECTIVE    CC/Focus of Exam: alcohol , opioid and meth use    HPI: Aniceto Toledo is a 38 y.o. male who was admitted on 2024 with complaints of alcohol, opioid and meth use and withdrawals. The patient reports a long history of substance use. First use was at 23 when he was prescribed pain pills for left foot injury. He started using meth at age 27 and alcohol at age 35.. Over time the use increased and the patient  continued to use despite negative consequences including relationship problems, social and financial problems. The patient endorses symptoms of tolerance and withdrawals and ongoing cravings to use. Has tried to cut down and stop but has not been successful. Spends too much time and resources in pursuit of substance use. Longest period of sobriety is reported to be 3.5 years.  Currently using half a gallon of liquor a day for a month. Pt states his last drink was at 9 am 24. Meth - \"occasional use - last used 3-4 days ago\"   Withdrawal symptoms tremors, fatigue, htn.He reports a history of alcohol withdrawal seizures.     PAST PSYCHIATRIC HX: Patient reports he has been treated for anxiety in the past.      SUBSTANCE USE HX: See HPI. Patient is on MAT with Suboxone. JONA reviewed.    SOCIAL HX:   Social History     Socioeconomic History    Marital status: Legally      Spouse name: Gabe Toledo -     Number of children: 1    Years of education: 12    Highest education level: High school graduate   Tobacco Use    Smoking status: Every Day     Packs/day: 0.50     Years: 25.00     Additional pack years: 0.00     Total pack years: 12.50     Types: Cigarettes    Smokeless tobacco: Never   Vaping Use    Vaping Use: Former   Substance and " "Sexual Activity    Alcohol use: Yes     Comment: \"1/2 gallon of vodka daily\"    Drug use: Yes     Types: Marijuana, Amphetamines, Methamphetamines, Other, Benzodiazepines, Oxycodone, Hydrocodone     Comment: alcohol, suboxone, opiates    Sexual activity: Yes     Partners: Female         Past Medical History:   Diagnosis Date    Acid reflux     Alcohol abuse     Alcoholism     Hepatitis C     HTN (hypertension)     Seizure due to alcohol withdrawal     July 2023 per pt          Past Surgical History:   Procedure Laterality Date    FOOT SURGERY Left     HIP FUSION Left     age 24    SKIN GRAFT Right     \"left face, left shoulder, bilat hands, left foot\" age 8       Family History   Problem Relation Age of Onset    Alcohol abuse Mother     Alcohol abuse Father          Medications Prior to Admission   Medication Sig Dispense Refill Last Dose    albuterol sulfate  (90 Base) MCG/ACT inhaler Inhale 2 puffs Every 4 (Four) Hours As Needed for Wheezing.       buprenorphine-naloxone (SUBOXONE) 8-2 MG per SL tablet Place 1.5 tablets under the tongue Daily.       naloxone (NARCAN) 4 MG/0.1ML nasal spray 1 spray into the nostril(s) as directed by provider As Needed for Opioid Reversal.            ALLERGIES:  Patient has no known allergies.    Temp:  [97.5 °F (36.4 °C)-98.5 °F (36.9 °C)] 97.5 °F (36.4 °C)  Heart Rate:  [] 80  Resp:  [16-20] 18  BP: (142-172)/() 153/101    REVIEW OF SYSTEMS:  Review of Systems   Constitutional:  Positive for chills, diaphoresis and fatigue.   HENT: Negative.     Eyes: Negative.    Respiratory: Negative.     Cardiovascular: Negative.    Gastrointestinal:  Positive for nausea.   Endocrine: Negative.    Genitourinary: Negative.    Musculoskeletal:  Positive for myalgias.   Skin: Negative.    Allergic/Immunologic: Negative.    Neurological:  Positive for tremors and weakness.   Hematological: Negative.    Psychiatric/Behavioral:  Positive for dysphoric mood. The patient is " nervous/anxious.         OBJECTIVE    PHYSICAL EXAM:  Physical Exam  Constitutional:  Appears well-developed and well-nourished.   HENT:   Head: Normocephalic and atraumatic.   Right Ear: External ear normal.   Left Ear: External ear normal.   Mouth/Throat: Oropharynx is clear and moist.   Eyes: Pupils are equal, round, and reactive to light. Conjunctivae and EOM are normal.   Neck: Normal range of motion. Neck supple.   Cardiovascular: Normal rate, regular rhythm and normal heart sounds.    Respiratory: Effort normal and breath sounds normal. No respiratory distress. No wheezes.   GI: Soft. Bowel sounds are normal.No distension. There is no tenderness.   Musculoskeletal: Normal range of motion. No edema or deformity.   Neurological:No cranial nerve deficit. Coordination normal.   Skin: Skin is warm and dry. No rash noted. No erythema.     MENTAL STATUS EXAM:   Hygiene:   fair  Cooperation:  Cooperative  Eye Contact:  Fair  Psychomotor Behavior:  Appropriate  Affect:  Appropriate  Hopelessness: Denies  Speech:  Normal  Thought Process: Goal directed  Thought Content:  Normal  Suicidal:  None  Homicidal:  None  Hallucinations:  None  Delusion:  None  Memory:  Intact  Orientation:  Person, Place, Time and Situation  Reliability:  fair  Insight:  Fair  Judgement:  Fair  Impulse Control:  Fair    Imaging Results (Last 24 Hours)       ** No results found for the last 24 hours. **             ECG/EMG Results (most recent)       Procedure Component Value Units Date/Time    ECG 12 Lead Other; Baseline Cardiac Status [[id number]] Collected: 01/25/24 0506     Updated: 01/25/24 1117     QT Interval 424 ms      QTC Interval 444 ms     Narrative:      Test Reason : Other~  Blood Pressure :   */*   mmHG  Vent. Rate :  66 BPM     Atrial Rate :  66 BPM     P-R Int : 156 ms          QRS Dur :  88 ms      QT Int : 424 ms       P-R-T Axes :  56  83  64 degrees     QTc Int : 444 ms    Normal sinus rhythm  Normal ECG  When compared with  ECG of 17-DEC-2023 22:38,  ST no longer elevated in Anterior leads  Confirmed by Mark Catalan ([id number]) on 1/25/2024 11:15:03 AM    Referred By: IRVING           Confirmed By: Mark Catalan             Lab Results   Component Value Date    GLUCOSE 215 (H) 01/24/2024    BUN 10 01/24/2024    CREATININE 0.65 (L) 01/24/2024    BCR 15.4 01/24/2024    CO2 23.9 01/24/2024    CALCIUM 8.9 01/24/2024    ALBUMIN 3.8 01/24/2024     (H) 01/24/2024     (H) 01/24/2024       Lab Results   Component Value Date    WBC 4.20 01/24/2024    HGB 13.5 01/24/2024    HCT 40.3 01/24/2024    .8 (H) 01/24/2024     01/24/2024       Last Urine Toxicity  More data exists         Latest Ref Rng & Units 1/24/2024 1/22/2024   LAST URINE TOXICITY RESULTS   Amphetamine, Urine Qual Negative Negative  Negative    Barbiturates Screen, Urine Negative Negative  Negative    Benzodiazepine Screen, Urine Negative Negative  Negative    Buprenorphine, Screen, Urine Negative Positive  Positive    Cocaine Screen, Urine Negative Negative  Negative    Fentanyl, Urine Negative Negative  Negative    Methadone Screen , Urine Negative Negative  Negative    Methamphetamine, Ur Negative Negative  Positive        Brief Urine Lab Results  (Last result in the past 365 days)        Color   Clarity   Blood   Leuk Est   Nitrite   Protein   CREAT   Urine HCG        01/24/24 2118 Yellow   Clear   Negative   Negative   Negative   Negative                   ASSESSMENT & PLAN:    Hospital bed: No      Alcohol use disorder, severe, dependence    Alcohol withdrawal  -Ativan detox  -Thiamine and folate      Opioid use disorder, severe, dependence on maintenance therapy  -Continue MAT      Methamphetamine use disorder, severe  -UDS is negative.       Nicotine use disorder  -Encouraged cessation      The patient has been admitted for safety and stabilization.  Patient will be monitored for suicidality daily and maintained on Special Precautions Level 4 (q30 min  checks).  The patient will have individual and group therapy with a master's level therapist. A master treatment plan will be developed and agreed upon by the patient and his/her treatment team.  The patient's estimated length of stay in the hospital is 5-7 days.

## 2024-01-25 NOTE — NURSING NOTE
NOTIFIED DR. BRAR OF PATIENTS CONTINUING ELEVATED BP AFTER ATIVAN. NEW ORDER NTD TO INCREASE ATIVAN 2MG PO Q1HR PRN. MED NURSE AWARE

## 2024-01-25 NOTE — PLAN OF CARE
Goal Outcome Evaluation:  Plan of Care Reviewed With: patient  Patient Agreement with Plan of Care: agrees     Progress: no change  Outcome Evaluation: Pt has been resting in his bed most of shift. Pt blood pressure continues to be elevated despite medication but patient seems to be resting well. Pt denies SI/HI/AVH.  Pt rates anxiety and depression 8/10. Pt has had no distress ntd or no complaints this shift.

## 2024-01-25 NOTE — PLAN OF CARE
Problem: Adult Behavioral Health Plan of Care  Goal: Plan of Care Review  Outcome: Ongoing, Progressing  Flowsheets (Taken 1/25/2024 1617)  Consent Given to Review Plan with: no consent today  Progress: no change  Plan of Care Reviewed With: patient  Patient Agreement with Plan of Care: agrees  Outcome Evaluation: Reviewed plan of care and completed adult social history.  Goal: Patient-Specific Goal (Individualization)  Outcome: Ongoing, Progressing  Flowsheets  Taken 1/25/2024 1617  Patient-Specific Goals (Include Timeframe): Aniceto to complete medical detox prior to discharge, identify 1-2 healthy coping skills to assist with relapse prevention during patients 3-7 day hospital stay, engage in safe disposition planning prior to discharge  Individualized Care Needs: Medication management, individual and group therapy.  Anxieties, Fears or Concerns: none  Taken 1/25/2024 1612  Patient Personal Strengths:   motivated for treatment   resourceful  Patient Vulnerabilities:   poor impulse control   substance abuse/addiction  Goal: Optimized Coping Skills in Response to Life Stressors  Outcome: Ongoing, Progressing  Flowsheets (Taken 1/25/2024 1617)  Optimized Coping Skills in Response to Life Stressors: making progress toward outcome  Intervention: Promote Effective Coping Strategies  Flowsheets (Taken 1/25/2024 1617)  Supportive Measures:   active listening utilized   positive reinforcement provided   self-responsibility promoted   counseling provided   problem-solving facilitated   verbalization of feelings encouraged   decision-making supported   goal-setting facilitated   self-care encouraged   self-reflection promoted  Goal: Develops/Participates in Therapeutic [place] to Support Successful Transition  Outcome: Ongoing, Progressing  Flowsheets (Taken 1/25/2024 1617)  Develops/Participates in Therapeutic [place] to Support Successful Transition: making progress toward outcome  Intervention: Foster Therapeutic  [place]  Flowsheets (Taken 1/25/2024 1617)  Trust Relationship/Rapport:   care explained   reassurance provided   choices provided   thoughts/feelings acknowledged   emotional support provided   empathic listening provided   questions answered   questions encouraged  Intervention: Mutually Develop Transition Plan  Flowsheets  Taken 1/25/2024 1617  Transition Support:   community resources reviewed   crisis management plan promoted   follow-up care discussed  Readmission Within the Last 30 Days: no previous admission in last 30 days  Taken 1/25/2024 1606  Discharge Coordination/Progress: Patient has [employer] insurance, will require assistance with transport and consents to return to [facility]  Transportation Anticipated:   public transportation   agency  Transportation Concerns: no car  Current Discharge Risk: substance use/abuse  Concerns to be Addressed: substance/tobacco abuse/use  Patient/Family Anticipated Services at Transition: rehabilitation services  Patient's Choice of Community Agency(s): Patient consents to return to [facility]  Patient/Family Anticipates Transition to: inpatient rehabilitation facility  Offered/Gave Vendor List: no   Goal Outcome Evaluation:  Plan of Care Reviewed With: patient  Patient Agreement with Plan of Care: agrees  Consent Given to Review Plan with: no consent today  Progress: no change  Outcome Evaluation: Reviewed plan of care and completed adult social history.    DATA:         Therapist met individually with patient this date to introduce role and to discuss hospitalization expectations. Patient agreeable.      Clinical Maneuvering/Intervention:     Therapist assisted patient in processing above session content; acknowledged and normalized patient’s thoughts, feelings, and concerns.  Discussed the therapist/patient relationship and explain the parameters and limitations of relative confidentiality.  Also discussed the importance of active participation, and honesty to  the treatment process.  Encouraged the patient to discuss/vent their feelings, frustrations, and fears concerning their ongoing medical issues and validated their feelings.     Discussed the importance of finding enjoyable activities and coping skills that the patient can engage in a regular basis. Discussed healthy coping skills such as distraction, self love, grounding, thought challenges/reframing, etc.  Provided patient with list of healthy coping skills this date. Discussed the importance of medication compliance.  Praised the patient for seeking help and spent the majority of the session building rapport.       Allowed patient to freely discuss issues without interruption or judgment. Provided safe, confidential environment to facilitate the development of positive therapeutic relationship and encourage open, honest communication.      Therapist addressed discharge safety planning this date. Assisted patient in identifying risk factors which would indicate the need for higher level of care after discharge;  including thoughts to harm self or others and/or self-harming behavior. Encouraged patient to call 911, or present to the nearest emergency room should any of these events occur. Discussed crisis intervention services and means to access.  Encouraged securing any objects of harm.       Therapist completed integrated summary, treatment plan, and initiated social history this date.  Therapist is strongly encouraging family involvement in treatment.       ASSESSMENT:      The patient is a 38-year-old  male admitted for medical detox from daily substance abuse. Therapist met with patient briefly on this date to complete assessment as patient was struggling to stay awake. Patient reports some anxiety and depression, denies SI/HI/AVH. Patient reports experiencing tremors and fatigue. Last [facility] admission was on 12/17/23. Patient reports current use of alcohol and suboxone. Patient began using  as a teen and reports his longest period of sobriety has been 3.5 years. Patient reports he will attend residential DA treatment at discharge and consented to return to [facility].     PLAN:       Patient to remain hospitalized this date.     Treatment team will focus efforts on stabilizing patient's acute symptoms while providing education on healthy coping and crisis management to reduce hospitalizations.   Patient requires daily psychiatrist evaluation and 24/7 nursing supervision to promote patient  safety.     Therapist will offer 1-4 individual sessions, 1 therapy group daily, family education, and appropriate referral.    Patient consents to attend [facility] upon stabilization.

## 2024-01-25 NOTE — PLAN OF CARE
Goal Outcome Evaluation:  Plan of Care Reviewed With: patient  Patient Agreement with Plan of Care: agrees     Progress: improving     Pt new admit for pm shift. Given Ativan PAS dose and Atarax prn medications.

## 2024-01-25 NOTE — NURSING NOTE
Contacted Dr. DEVANTE Sampson, presented clinicals, labs. MD advised that we can offer pt detox admission with ativan protocol if he is agreeable. Cannot admit pt and his girlfriend to same unit and not eligible for psych admission unit. RBTO x2. Pt/ED Provider/Rebeca and pt aware.

## 2024-01-25 NOTE — NURSING NOTE
Notified ED Provider, MD Rebeca -  regarding blood pressure 169/115, heart rate 93. MD advised he will placing new orders for ativan and clonidine. MD aware pt received previous doses at 2307.

## 2024-01-26 PROCEDURE — 99232 SBSQ HOSP IP/OBS MODERATE 35: CPT | Performed by: PSYCHIATRY & NEUROLOGY

## 2024-01-26 RX ADMIN — LORAZEPAM 2 MG: 2 TABLET ORAL at 21:14

## 2024-01-26 RX ADMIN — LORAZEPAM 2 MG: 2 TABLET ORAL at 13:02

## 2024-01-26 RX ADMIN — Medication 1 TABLET: at 09:02

## 2024-01-26 RX ADMIN — BUPRENORPHINE HYDROCHLORIDE AND NALOXONE HYDROCHLORIDE DIHYDRATE 1.5 TABLET: 8; 2 TABLET SUBLINGUAL at 09:00

## 2024-01-26 RX ADMIN — NICOTINE TRANSDERMAL SYSTEM 1 PATCH: 21 PATCH, EXTENDED RELEASE TRANSDERMAL at 09:06

## 2024-01-26 RX ADMIN — LORAZEPAM 2 MG: 2 TABLET ORAL at 09:02

## 2024-01-26 RX ADMIN — LORAZEPAM 2 MG: 2 TABLET ORAL at 14:14

## 2024-01-26 RX ADMIN — Medication 2 TABLET: at 09:02

## 2024-01-26 RX ADMIN — LORAZEPAM 2 MG: 2 TABLET ORAL at 11:49

## 2024-01-26 RX ADMIN — TRAZODONE HYDROCHLORIDE 50 MG: 50 TABLET ORAL at 21:13

## 2024-01-26 RX ADMIN — LORAZEPAM 2 MG: 2 TABLET ORAL at 15:17

## 2024-01-26 RX ADMIN — HYDROXYZINE HYDROCHLORIDE 50 MG: 50 TABLET ORAL at 21:13

## 2024-01-26 RX ADMIN — Medication 100 MG: at 09:02

## 2024-01-26 NOTE — PLAN OF CARE
Problem: Adult Behavioral Health Plan of Care  Goal: Develops/Participates in Therapeutic [place] to Support Successful Transition  Intervention: Mutually Develop Transition Plan  Recent Flowsheet Documentation  Taken 1/26/2024 1631 by Suly Chino LCSW  Transportation Anticipated: public transportation  Transportation Concerns: no car  Current Discharge Risk: substance use/abuse  Concerns to be Addressed: substance/tobacco abuse/use  Readmission Within the Last 30 Days: no previous admission in last 30 days  Patient/Family Anticipated Services at Transition: rehabilitation services  Patient/Family Anticipates Transition to: inpatient rehabilitation facility  Offered/Gave Vendor List: no      Patient has been unsteady and confused at times.  He has ongoing withdrawals and cravings.  He has remained in bed most of the day today resting.  He has consented to attend [facility] upon stabilization.

## 2024-01-26 NOTE — PLAN OF CARE
Problem: Adult Behavioral Health Plan of Care  Goal: Plan of Care Review  Outcome: Ongoing, Progressing  Flowsheets  Taken 1/26/2024 1400  Progress: no change  Plan of Care Reviewed With: patient  Patient Agreement with Plan of Care: agrees  Outcome Evaluation: Patient has been confused at times but redirectable this shift and has spent most of the day in his room. His speech remains mumbled. Patient is unsteady. Patient is disoriented to time. Patient reports good appetite and good sleep. Rates anxiety 4/10 and depression 3/10. Patient denies SI/HI/AVH. Craving 0/10 Pt voices no concerns at this time. Sitter remains at bed side for safety  Taken 1/26/2024 0753  Plan of Care Reviewed With: patient  Patient Agreement with Plan of Care: agrees   Goal Outcome Evaluation:  Plan of Care Reviewed With: patient  Patient Agreement with Plan of Care: agrees     Progress: no change  Outcome Evaluation: Patient has been confused at times but redirectable this shift and has spent most of the day in his room. His speech remains mumbled. Patient is unsteady. Patient is disoriented to time. Patient reports good appetite and good sleep. Rates anxiety 4/10 and depression 3/10. Patient denies SI/HI/AVH. Craving 0/10 Pt voices no concerns at this time. Sitter remains at bed side for safety

## 2024-01-26 NOTE — PLAN OF CARE
Goal Outcome Evaluation:  Plan of Care Reviewed With: patient  Patient Agreement with Plan of Care: agrees     Progress: improving     Pt had difficulty staying asleep. Pt become more confused later in the night. Appetite is poor for shift. Refused group. Pt slept 5-6 hours. Given Lopressor 50mg by new order for elevated blood pressure. Given Ativan 2mg PAS dose, Atarax, and Trazodone prn medications.

## 2024-01-26 NOTE — DISCHARGE INSTR - APPOINTMENTS
Patient to return to:    [facility]   [address] [place] [id number] [phone]    1/29/2024

## 2024-01-26 NOTE — PROGRESS NOTES
"INPATIENT PSYCHIATRIC PROGRESS NOTE    Name:  Aniceto Toledo  :  1985  MRN:  [id number]  Visit Number:  [id number]  Length of stay:  1    SUBJECTIVE    CC/Focus of Exam: alcohol use    INTERVAL HISTORY:  The patient reports he is feeling a lot better than he was last night. He reports he had a nose bleed this morning but it has stopped. He is encouraged to stay hydrated.   Depression rating 5/10  Anxiety rating 7/10  Sleep: fair  Withdrawal sx: weakness, tremors, confusion, anxiety  Cravin/10    Review of Systems   Constitutional:  Positive for chills, diaphoresis and fatigue.   Respiratory: Negative.     Cardiovascular: Negative.    Gastrointestinal: Negative.    Neurological:  Positive for tremors and weakness.   Psychiatric/Behavioral:  Positive for dysphoric mood. The patient is nervous/anxious.        OBJECTIVE    Temp:  [97.2 °F (36.2 °C)-98.9 °F (37.2 °C)] 97.9 °F (36.6 °C)  Heart Rate:  [] 110  Resp:  [16-18] 18  BP: (132-159)/() 159/103    MENTAL STATUS EXAM:  Appearance:Casually dressed, good hygeine.   Cooperation:Cooperative  Psychomotor: No psychomotor agitation/retardation, No EPS, No motor tics  Speech-normal rate, amount.  Mood \"anxious\"   Affect-congruent, appropriate, stable  Thought Content-goal directed, no delusional material present  Thought process-linear, organized.  Suicidality: No SI  Homicidality: No HI  Perception: No AH/VH  Insight-fair   Judgement-fair    Lab Results (last 24 hours)       ** No results found for the last 24 hours. **               Imaging Results (Last 24 Hours)       ** No results found for the last 24 hours. **               ECG/EMG Results (most recent)       Procedure Component Value Units Date/Time    ECG 12 Lead Other; Baseline Cardiac Status [[id number]] Collected: 24 0506     Updated: 24 1117     QT Interval 424 ms      QTC Interval 444 ms     Narrative:      Test Reason : Other~  Blood Pressure :   */*   mmHG  Vent. Rate :  " 66 BPM     Atrial Rate :  66 BPM     P-R Int : 156 ms          QRS Dur :  88 ms      QT Int : 424 ms       P-R-T Axes :  56  83  64 degrees     QTc Int : 444 ms    Normal sinus rhythm  Normal ECG  When compared with ECG of 17-DEC-2023 22:38,  ST no longer elevated in Anterior leads  Confirmed by Mark Catalan ([id number]) on 1/25/2024 11:15:03 AM    Referred By: IRVING           Confirmed By: Mark Catalan             ALLERGIES: Patient has no known allergies.    Medication Review:   Scheduled Medications:  B-complex with vitamin C, 2 tablet, Oral, Daily  buprenorphine-naloxone, 1.5 tablet, Sublingual, Daily  LORazepam, 2 mg, Oral, 3 times per day   Followed by  [START ON 1/27/2024] LORazepam, 1.5 mg, Oral, 3 times per day   Followed by  [START ON 1/28/2024] LORazepam, 1 mg, Oral, 3 times per day   Followed by  [START ON 1/29/2024] LORazepam, 0.5 mg, Oral, 3 times per day  multivitamin with minerals, 1 tablet, Oral, Daily  nicotine, 1 patch, Transdermal, Q24H  thiamine, 100 mg, Oral, Daily         PRN Medications:    albuterol sulfate HFA    aluminum-magnesium hydroxide-simethicone    benzonatate    benztropine **OR** benztropine    famotidine    hydrOXYzine    ibuprofen    loperamide    LORazepam **FOLLOWED BY** [START ON 1/27/2024] LORazepam **FOLLOWED BY** [START ON 1/28/2024] LORazepam **FOLLOWED BY** [START ON 1/29/2024] LORazepam    LORazepam    magnesium hydroxide    ondansetron ODT    sodium chloride    traZODone   All medications reviewed.    ASSESSMENT & PLAN:      Alcohol use disorder, severe, dependence    Alcohol withdrawal  -Continue Ativan detox  -Thiamine and folate       Opioid use disorder, severe, dependence on maintenance therapy  -Continue MAT       Methamphetamine use disorder, severe  -UDS is negative.        Nicotine use disorder  -Encouraged cessation    Special precautions: Special Precautions Level 4 (q30 min checks).    Behavioral Health Treatment Plan and Problem List: I have reviewed and  approved the Behavioral Health Treatment Plan and Problem list.  The patient has had a chance to review and agrees with the treatment plan.    Copied text in portions of this note has been reviewed and is accurate as of 01/26/24         Clinician:  Camron Beckwith MD  01/26/24  12:15 EST

## 2024-01-26 NOTE — PROGRESS NOTES
Navigator is helping with the following referral:    [facility] [phone]  -Spoke with staff. They will accept patient at discharge.  1/26

## 2024-01-26 NOTE — NURSING NOTE
Dr. Bruce Sampson contacted related to patient blood pressure rising after PAS dose.     Blood pressure now 154/108 and Heartrate 105. Patient has been given 20mg of Ativan total for today. CIWA 6.     New order given :  Metroprolol 50mg PO once

## 2024-01-26 NOTE — NURSING NOTE
Patient had nose bleed while sleeping. No signs or symptoms of distress noted. Patient denies any issues or complaints at this time. MD notified.

## 2024-01-27 PROCEDURE — 99232 SBSQ HOSP IP/OBS MODERATE 35: CPT | Performed by: PSYCHIATRY & NEUROLOGY

## 2024-01-27 RX ADMIN — IBUPROFEN 400 MG: 400 TABLET, FILM COATED ORAL at 08:19

## 2024-01-27 RX ADMIN — HYDROXYZINE HYDROCHLORIDE 50 MG: 50 TABLET ORAL at 21:23

## 2024-01-27 RX ADMIN — Medication 1 TABLET: at 08:19

## 2024-01-27 RX ADMIN — NICOTINE TRANSDERMAL SYSTEM 1 PATCH: 21 PATCH, EXTENDED RELEASE TRANSDERMAL at 08:19

## 2024-01-27 RX ADMIN — TRAZODONE HYDROCHLORIDE 50 MG: 50 TABLET ORAL at 21:23

## 2024-01-27 RX ADMIN — LORAZEPAM 1.5 MG: 1 TABLET ORAL at 08:19

## 2024-01-27 RX ADMIN — LORAZEPAM 1.5 MG: 1 TABLET ORAL at 21:22

## 2024-01-27 RX ADMIN — Medication 2 TABLET: at 08:19

## 2024-01-27 RX ADMIN — BUPRENORPHINE HYDROCHLORIDE AND NALOXONE HYDROCHLORIDE DIHYDRATE 1.5 TABLET: 8; 2 TABLET SUBLINGUAL at 08:19

## 2024-01-27 RX ADMIN — Medication 100 MG: at 08:19

## 2024-01-27 RX ADMIN — HYDROXYZINE HYDROCHLORIDE 50 MG: 50 TABLET ORAL at 08:19

## 2024-01-27 NOTE — PLAN OF CARE
Goal Outcome Evaluation:  Plan of Care Reviewed With: patient  Patient Agreement with Plan of Care: agrees     Progress: no change  Outcome Evaluation: Pt somewhat confused this AM, with unsteady gait. Sitter at bedside with pt. Pt fell asleep mid-morning and has slept majority of day since. Pt awakens easily but quickly returns to sleep. No distress noted.

## 2024-01-27 NOTE — PLAN OF CARE
Goal Outcome Evaluation:  Plan of Care Reviewed With: patient  Patient Agreement with Plan of Care: agrees     Progress: improving       Pt intermittently confused and very little sleep for this shift. Patient becoming more difficulty to redirect and is more argumentative due to cognitive state. Patient slept no more than 1 hour for shift. Sitter remains at bedside for safety. Pt given Trazodone and atarax prn medications.

## 2024-01-27 NOTE — PROGRESS NOTES
"    Detox Recovery Unit Progress Note   Clinician: Bruce Sampson MD  Admission Date: 1/25/2024  10:25 EST 01/27/24    Behavioral Health Treatment Plan and Problem List: I have reviewed and approved the Behavioral Health Treatment Plan and Problem list.    Allergies  No Known Allergies    Hospital Day: 2 days      Assessment completed within view of staff    History  CC: withdrawal symptoms    Interval HPI: Patient seen and evaluated by me.  Chart reviewed. Patient is withdrawing from alcohol.  Current withdrawal symptoms include: anxiety, insomnia, sweating, tremor, body aches, malaise, headache.        Review of Systems   Constitutional:  Positive for appetite change, chills, diaphoresis and fatigue.   HENT: Negative.     Eyes: Negative.    Respiratory: Negative.     Cardiovascular: Negative.    Gastrointestinal: Negative.    Endocrine: Negative.    Genitourinary: Negative.    Musculoskeletal:  Positive for myalgias.   Skin: Negative.    Allergic/Immunologic: Negative.    Neurological:  Positive for tremors and weakness.   Hematological: Negative.    All other systems reviewed and are negative.       /72 (BP Location: Right arm, Patient Position: Lying)   Pulse 75   Temp 98.5 °F (36.9 °C) (Temporal)   Resp 16   Ht 167.6 cm (66\")   Wt 67.1 kg (148 lb)   SpO2 97%   BMI 23.89 kg/m²     Mental Status Exam  Mood: anxious  Affect: mood congruent  Thought Processes: linear  Oriented X3:  yes  Thought Content: sensorium intact   Suicidal Thoughts: denies  Homicidal Thoughts: denies        Medical Decision Making:   Labs:     Lab Results (last 24 hours)       ** No results found for the last 24 hours. **              Radiology:     Imaging Results (Last 24 Hours)       ** No results found for the last 24 hours. **              EKG:     ECG/EMG Results (most recent)       Procedure Component Value Units Date/Time    ECG 12 Lead Other; Baseline Cardiac Status [[id number]] Collected: 01/25/24 0506     Updated: " 01/25/24 1117     QT Interval 424 ms      QTC Interval 444 ms     Narrative:      Test Reason : Other~  Blood Pressure :   */*   mmHG  Vent. Rate :  66 BPM     Atrial Rate :  66 BPM     P-R Int : 156 ms          QRS Dur :  88 ms      QT Int : 424 ms       P-R-T Axes :  56  83  64 degrees     QTc Int : 444 ms    Normal sinus rhythm  Normal ECG  When compared with ECG of 17-DEC-2023 22:38,  ST no longer elevated in Anterior leads  Confirmed by Mark Catalan ([id number]) on 1/25/2024 11:15:03 AM    Referred By: IRVING           Confirmed By: Mark Catalan             Medications:  B-complex with vitamin C, 2 tablet, Oral, Daily  buprenorphine-naloxone, 1.5 tablet, Sublingual, Daily  LORazepam, 1.5 mg, Oral, 3 times per day   Followed by  [START ON 1/28/2024] LORazepam, 1 mg, Oral, 3 times per day   Followed by  [START ON 1/29/2024] LORazepam, 0.5 mg, Oral, 3 times per day  multivitamin with minerals, 1 tablet, Oral, Daily  nicotine, 1 patch, Transdermal, Q24H  thiamine, 100 mg, Oral, Daily           All medications reviewed.      Assessment and Plan:   Alcohol use disorder, severe, dependence    Alcohol withdrawal  -Continue Ativan detox  -Thiamine and folate       Opioid use disorder, severe, dependence on maintenance therapy  -Continue MAT       Methamphetamine use disorder, severe  -UDS is negative.        Nicotine use disorder  Encouraged cessation     Continue hospitalization for medical management of drug withdrawal and patient safety and stabilization.  Continue individual and group chemical dependency counseling.   Therapist and treatment team will continue to assist patient in establishing plans for follow-up and rehabilitation that will serve as the next step in care once patient has completed the medical detox.    I, Bruce Sampson MD, I have completed an encounter with the patient today, provided ongoing monitoring and/or adjustments to the assessment and plan described and documented above, and believe this  information to be both accurate and complete as of 1/27/2024

## 2024-01-28 PROCEDURE — 99232 SBSQ HOSP IP/OBS MODERATE 35: CPT | Performed by: PSYCHIATRY & NEUROLOGY

## 2024-01-28 RX ADMIN — LORAZEPAM 1 MG: 1 TABLET ORAL at 08:26

## 2024-01-28 RX ADMIN — Medication 1 TABLET: at 08:26

## 2024-01-28 RX ADMIN — LORAZEPAM 1 MG: 1 TABLET ORAL at 21:18

## 2024-01-28 RX ADMIN — Medication 100 MG: at 08:26

## 2024-01-28 RX ADMIN — BUPRENORPHINE HYDROCHLORIDE AND NALOXONE HYDROCHLORIDE DIHYDRATE 1.5 TABLET: 8; 2 TABLET SUBLINGUAL at 08:26

## 2024-01-28 RX ADMIN — Medication 2 TABLET: at 08:25

## 2024-01-28 RX ADMIN — LORAZEPAM 1 MG: 1 TABLET ORAL at 14:58

## 2024-01-28 RX ADMIN — HYDROXYZINE HYDROCHLORIDE 50 MG: 50 TABLET ORAL at 21:18

## 2024-01-28 RX ADMIN — TRAZODONE HYDROCHLORIDE 50 MG: 50 TABLET ORAL at 21:18

## 2024-01-28 RX ADMIN — IBUPROFEN 400 MG: 400 TABLET, FILM COATED ORAL at 21:18

## 2024-01-28 RX ADMIN — HYDROXYZINE HYDROCHLORIDE 50 MG: 50 TABLET ORAL at 08:26

## 2024-01-28 NOTE — PLAN OF CARE
Goal Outcome Evaluation:  Plan of Care Reviewed With: patient  Patient Agreement with Plan of Care: agrees     Progress: improving  Outcome Evaluation: pt calm and cooperative throughout assessment. pt slurring and speech pattern slow, but pt a/o x 4. gait still mildly unsteady but able to walk to med window. rates depression 1, anxiety 4, craving 0. denies SI/HI/AVH. reports good sleep and appetite.

## 2024-01-28 NOTE — PROGRESS NOTES
"    Detox Recovery Unit Progress Note   Clinician: Bruce Sampson MD  Admission Date: 1/25/2024  11:06 EST 01/28/24    Behavioral Health Treatment Plan and Problem List: I have reviewed and approved the Behavioral Health Treatment Plan and Problem list.    Allergies  No Known Allergies    Hospital Day: 3 days      Assessment completed within view of staff    History  CC: withdrawal symptoms    Interval HPI: Patient seen and evaluated by me.  Chart reviewed. Patient is withdrawing from alcohol  Current withdrawal symptoms include: Headache, fatigue, malaise      Review of Systems   Constitutional:  Positive for activity change and fatigue.   Eyes: Negative.    Respiratory: Negative.     Cardiovascular: Negative.    Gastrointestinal: Negative.    Endocrine: Negative.    Genitourinary: Negative.    Musculoskeletal: Negative.    Skin: Negative.    Allergic/Immunologic: Negative.    Neurological:  Positive for headaches.   Hematological: Negative.    All other systems reviewed and are negative.       /69 (BP Location: Right arm, Patient Position: Sitting)   Pulse 73   Temp 98.4 °F (36.9 °C) (Temporal)   Resp 18   Ht 167.6 cm (66\")   Wt 67.1 kg (148 lb)   SpO2 96%   BMI 23.89 kg/m²     Mental Status Exam  Mood: normal  Affect: mood congruent  Thought Processes: linear  Oriented X3:  yes  Thought Content: sensorium intact   Suicidal Thoughts: denies  Homicidal Thoughts: denies        Medical Decision Making:   Labs:     Lab Results (last 24 hours)       ** No results found for the last 24 hours. **              Radiology:     Imaging Results (Last 24 Hours)       ** No results found for the last 24 hours. **              EKG:     ECG/EMG Results (most recent)       Procedure Component Value Units Date/Time    ECG 12 Lead Other; Baseline Cardiac Status [[id number]] Collected: 01/25/24 0506     Updated: 01/25/24 1117     QT Interval 424 ms      QTC Interval 444 ms     Narrative:      Test Reason : Other~  Blood " Pressure :   */*   mmHG  Vent. Rate :  66 BPM     Atrial Rate :  66 BPM     P-R Int : 156 ms          QRS Dur :  88 ms      QT Int : 424 ms       P-R-T Axes :  56  83  64 degrees     QTc Int : 444 ms    Normal sinus rhythm  Normal ECG  When compared with ECG of 17-DEC-2023 22:38,  ST no longer elevated in Anterior leads  Confirmed by Mark Catalan ([id number]) on 1/25/2024 11:15:03 AM    Referred By: IRVING           Confirmed By: Mark Catalan             Medications:  B-complex with vitamin C, 2 tablet, Oral, Daily  buprenorphine-naloxone, 1.5 tablet, Sublingual, Daily  LORazepam, 1 mg, Oral, 3 times per day   Followed by  [START ON 1/29/2024] LORazepam, 0.5 mg, Oral, 3 times per day  multivitamin with minerals, 1 tablet, Oral, Daily  nicotine, 1 patch, Transdermal, Q24H  thiamine, 100 mg, Oral, Daily           All medications reviewed.      Assessment and Plan:   Alcohol use disorder, severe, dependence    Alcohol withdrawal  -Continue Ativan detox  -Thiamine and folate       Opioid use disorder, severe, dependence on maintenance therapy  -Continue MAT       Methamphetamine use disorder, severe  -UDS is negative.        Nicotine use disorder  Encouraged cessation       Continue hospitalization for medical management of drug withdrawal and patient safety and stabilization.  Continue individual and group chemical dependency counseling.   Therapist and treatment team will continue to assist patient in establishing plans for follow-up and rehabilitation that will serve as the next step in care once patient has completed the medical detox.    IBruce MD, I have completed an encounter with the patient today, provided ongoing monitoring and/or adjustments to the assessment and plan described and documented above, and believe this information to be both accurate and complete as of 1/28/2024

## 2024-01-28 NOTE — PLAN OF CARE
Goal Outcome Evaluation:  Plan of Care Reviewed With: patient  Patient Agreement with Plan of Care: agrees     Progress: improving  Outcome Evaluation: Pt A&Ox4 with steady gait. Pt reports only bodyaches, and mild tremor noted. Pt's condition greatly improved from previous day. Denies SI/HI/AVH. No distress noted.

## 2024-01-29 VITALS
HEART RATE: 105 BPM | WEIGHT: 148 LBS | OXYGEN SATURATION: 93 % | HEIGHT: 66 IN | DIASTOLIC BLOOD PRESSURE: 87 MMHG | TEMPERATURE: 97 F | BODY MASS INDEX: 23.78 KG/M2 | RESPIRATION RATE: 18 BRPM | SYSTOLIC BLOOD PRESSURE: 136 MMHG

## 2024-01-29 PROCEDURE — 99238 HOSP IP/OBS DSCHRG MGMT 30/<: CPT | Performed by: PSYCHIATRY & NEUROLOGY

## 2024-01-29 RX ADMIN — LORAZEPAM 1 MG: 1 TABLET ORAL at 02:52

## 2024-01-29 RX ADMIN — LORAZEPAM 0.5 MG: 0.5 TABLET ORAL at 08:21

## 2024-01-29 RX ADMIN — Medication 2 TABLET: at 08:22

## 2024-01-29 RX ADMIN — Medication 100 MG: at 08:21

## 2024-01-29 RX ADMIN — BUPRENORPHINE HYDROCHLORIDE AND NALOXONE HYDROCHLORIDE DIHYDRATE 1.5 TABLET: 8; 2 TABLET SUBLINGUAL at 08:21

## 2024-01-29 RX ADMIN — Medication 1 TABLET: at 08:22

## 2024-01-29 RX ADMIN — IBUPROFEN 400 MG: 400 TABLET, FILM COATED ORAL at 08:22

## 2024-01-29 RX ADMIN — LORAZEPAM 0.5 MG: 0.5 TABLET ORAL at 14:19

## 2024-01-29 NOTE — CASE MANAGEMENT/SOCIAL WORK
..Bridge Session  Date: 1/29/2024   Time: 1140    Data:  Reason for Inpatient Admission: alcohol , opioid and meth use     [facility] up: [facility]   [address] [place] [id number] [phone]    1/29/2024      Coping Skills to Utilize: Patient encouraged to engage in physical activity, mindfulness, negative thought redirection and engaging their support system.    Crisis Safety Plan:  Support System to utilize and contact numbers: Patients friend and patient has contact number    Educated on crisis hotline numbers (yes/no): Yes    Was the Patient made aware of contact information for the following: community mental health centers, crisis stabilization programs, residential programs, , etc (yes/no): Yes    Will transportation be a barrier (yes/no): No  If so, explain solution(s) to resolve barrier: n/a    How and where will the patient obtain prescribed medications: Patients medications were filled today by [facility] and brought to patient.  The cost of the medication was covered by insurance.      Assessment: Patient is denying suicidal ideation today and denying homicidal ideation today.  Patient reports decrease in Depression today and decrease in Anxiety today.  Patient successful in identifying healthy coping and protective factors this date.  Patient is future oriented and ready for discharge.        Plan:    Discussed the importance of follow up treatment for continuity of care. The Patient was able to verbalize understanding and commitment to the individualized aftercare and crisis safety plan.      Suly Chino [facility] Hospital[facility]ACINDA

## 2024-01-29 NOTE — DISCHARGE SUMMARY
":  1985  MRN:  [id number]  Visit Number:  [id number]      Date of Admission:2024   Date of Discharge:  2024    Discharge Diagnosis:  Principal Problem:    Alcohol use disorder, severe, dependence  Active Problems:    Alcohol withdrawal    Opioid use disorder, severe, dependence    Methamphetamine use disorder, severe    Nicotine use disorder        Admission Diagnosis:  Alcohol abuse [F10.10]     RYAN Toledo is a 38 y.o. male who was admitted on 2024 with complaints of alcohol, opioid and meth use and withdrawals.   For details please see H&P dated 24.     Hospital Course  Patient is a 38 y.o. male presented with alcohol use and withdrawals. The patient was admitted to the [facility] detox recovery unit for safety, further evaluation and treatment.  The patient was started on Ativan detox and he was able to complete the detox without any adverse events. Patient had elevated blood pressure initially but it improved over the course of treatment.   The patient was also able to take part in individual and group counseling sessions and work on appropriate coping skills.  The patient made steady improvement in his withdrawals and mood and expressed feeling more positive and hopeful about future. Sleep and appetite were improved.  The day of discharge the patient was calm, cooperative and pleasant. Mood was reported to be good, and denied SI/HI/AVH. Also reported no medication side effects.        Mental Status Exam upon discharge:   Mood \"good\"   Affect-congruent, appropriate, stable  Thought Content-goal directed, no delusional material present  Thought process-linear, organized.  Suicidality: No SI  Homicidality: No HI  Perception: No AH/VH    Procedures Performed         Consults:   Consults       No orders found from 2023 to 2024.            Pertinent Test Results:   Admission on 2024   Component Date Value Ref Range Status    QT Interval 2024 424  ms " Final    QTC Interval 01/25/2024 444  ms Final   Admission on 01/24/2024, Discharged on 01/25/2024   Component Date Value Ref Range Status    Ethanol 01/24/2024 76 (H)  0 - 10 mg/dL Final    Ethanol % 01/24/2024 0.076  % Final    Glucose 01/24/2024 215 (H)  65 - 99 mg/dL Final    BUN 01/24/2024 10  6 - 20 mg/dL Final    Creatinine 01/24/2024 0.65 (L)  0.76 - 1.27 mg/dL Final    Sodium 01/24/2024 140  136 - 145 mmol/L Final    Potassium 01/24/2024 3.9  3.5 - 5.2 mmol/L Final    Slight hemolysis detected by analyzer. Result may be falsely elevated.    Chloride 01/24/2024 101  98 - 107 mmol/L Final    CO2 01/24/2024 23.9  22.0 - 29.0 mmol/L Final    Calcium 01/24/2024 8.9  8.6 - 10.5 mg/dL Final    Total Protein 01/24/2024 6.7  6.0 - 8.5 g/dL Final    Albumin 01/24/2024 3.8  3.5 - 5.2 g/dL Final    ALT (SGPT) 01/24/2024 213 (H)  1 - 41 U/L Final    AST (SGOT) 01/24/2024 289 (H)  1 - 40 U/L Final    Alkaline Phosphatase 01/24/2024 142 (H)  39 - 117 U/L Final    Total Bilirubin 01/24/2024 0.7  0.0 - 1.2 mg/dL Final    Globulin 01/24/2024 2.9  gm/dL Final    A/G Ratio 01/24/2024 1.3  g/dL Final    BUN/Creatinine Ratio 01/24/2024 15.4  7.0 - 25.0 Final    Anion Gap 01/24/2024 15.1 (H)  5.0 - 15.0 mmol/L Final    eGFR 01/24/2024 125.2  >60.0 mL/min/1.73 Final    Magnesium 01/24/2024 1.7  1.6 - 2.6 mg/dL Final    Color, UA 01/24/2024 Yellow  Yellow, Straw Final    Appearance, UA 01/24/2024 Clear  Clear Final    pH, UA 01/24/2024 7.0  5.0 - 8.0 Final    Specific Gravity, UA 01/24/2024 1.015  1.005 - 1.030 Final    Glucose, UA 01/24/2024 Negative  Negative Final    Ketones, UA 01/24/2024 Negative  Negative Final    Bilirubin, UA 01/24/2024 Negative  Negative Final    Blood, UA 01/24/2024 Negative  Negative Final    Protein, UA 01/24/2024 Negative  Negative Final    Leuk Esterase, UA 01/24/2024 Negative  Negative Final    Nitrite, UA 01/24/2024 Negative  Negative Final    Urobilinogen, UA 01/24/2024 1.0 E.U./dL  0.2 - 1.0  E.U./dL Final    THC, Screen, Urine 01/24/2024 Negative  Negative Final    Phencyclidine (PCP), Urine 01/24/2024 Negative  Negative Final    Cocaine Screen, Urine 01/24/2024 Negative  Negative Final    Methamphetamine, Ur 01/24/2024 Negative  Negative Final    Opiate Screen 01/24/2024 Negative  Negative Final    Amphetamine Screen, Urine 01/24/2024 Negative  Negative Final    Benzodiazepine Screen, Urine 01/24/2024 Negative  Negative Final    Tricyclic Antidepressants Screen 01/24/2024 Negative  Negative Final    Methadone Screen, Urine 01/24/2024 Negative  Negative Final    Barbiturates Screen, Urine 01/24/2024 Negative  Negative Final    Oxycodone Screen, Urine 01/24/2024 Negative  Negative Final    Buprenorphine, Screen, Urine 01/24/2024 Positive (A)  Negative Final    Fentanyl, Urine 01/24/2024 Negative  Negative Final    Extra Tube 01/24/2024 Hold for add-ons.   Final    Auto resulted.    Extra Tube 01/24/2024 hold for add-on   Final    Auto resulted    Extra Tube 01/24/2024 Hold for add-ons.   Final    Auto resulted.    Extra Tube 01/24/2024 Hold for add-ons.   Final    Auto resulted    WBC 01/24/2024 4.20  3.40 - 10.80 10*3/mm3 Final    RBC 01/24/2024 4.00 (L)  4.14 - 5.80 10*6/mm3 Final    Hemoglobin 01/24/2024 13.5  13.0 - 17.7 g/dL Final    Hematocrit 01/24/2024 40.3  37.5 - 51.0 % Final    MCV 01/24/2024 100.8 (H)  79.0 - 97.0 fL Final    MCH 01/24/2024 33.8 (H)  26.6 - 33.0 pg Final    MCHC 01/24/2024 33.5  31.5 - 35.7 g/dL Final    RDW 01/24/2024 13.4  12.3 - 15.4 % Final    RDW-SD 01/24/2024 50.0  37.0 - 54.0 fl Final    MPV 01/24/2024 9.2  6.0 - 12.0 fL Final    Platelets 01/24/2024 165  140 - 450 10*3/mm3 Final    Neutrophil % 01/24/2024 33.4 (L)  42.7 - 76.0 % Final    Lymphocyte % 01/24/2024 53.1 (H)  19.6 - 45.3 % Final    Monocyte % 01/24/2024 8.1  5.0 - 12.0 % Final    Eosinophil % 01/24/2024 4.5  0.3 - 6.2 % Final    Basophil % 01/24/2024 0.7  0.0 - 1.5 % Final    Immature Grans % 01/24/2024  0.2  0.0 - 0.5 % Final    Neutrophils, Absolute 01/24/2024 1.40 (L)  1.70 - 7.00 10*3/mm3 Final    Lymphocytes, Absolute 01/24/2024 2.23  0.70 - 3.10 10*3/mm3 Final    Monocytes, Absolute 01/24/2024 0.34  0.10 - 0.90 10*3/mm3 Final    Eosinophils, Absolute 01/24/2024 0.19  0.00 - 0.40 10*3/mm3 Final    Basophils, Absolute 01/24/2024 0.03  0.00 - 0.20 10*3/mm3 Final    Immature Grans, Absolute 01/24/2024 0.01  0.00 - 0.05 10*3/mm3 Final    nRBC 01/24/2024 0.0  0.0 - 0.2 /100 WBC Final    TSH 01/24/2024 1.610  0.270 - 4.200 uIU/mL Final   Admission on 01/22/2024, Discharged on 01/23/2024   Component Date Value Ref Range Status    Glucose 01/22/2024 128 (H)  65 - 99 mg/dL Final    BUN 01/22/2024 16  6 - 20 mg/dL Final    Creatinine 01/22/2024 0.77  0.76 - 1.27 mg/dL Final    Sodium 01/22/2024 143  136 - 145 mmol/L Final    Potassium 01/22/2024 4.2  3.5 - 5.2 mmol/L Final    Slight hemolysis detected by analyzer. Result may be falsely elevated.    Chloride 01/22/2024 105  98 - 107 mmol/L Final    CO2 01/22/2024 25.5  22.0 - 29.0 mmol/L Final    Calcium 01/22/2024 8.8  8.6 - 10.5 mg/dL Final    Total Protein 01/22/2024 7.1  6.0 - 8.5 g/dL Final    Albumin 01/22/2024 4.2  3.5 - 5.2 g/dL Final    ALT (SGPT) 01/22/2024 237 (H)  1 - 41 U/L Final    AST (SGOT) 01/22/2024 618 (H)  1 - 40 U/L Final    Alkaline Phosphatase 01/22/2024 115  39 - 117 U/L Final    Total Bilirubin 01/22/2024 0.5  0.0 - 1.2 mg/dL Final    Globulin 01/22/2024 2.9  gm/dL Final    A/G Ratio 01/22/2024 1.4  g/dL Final    BUN/Creatinine Ratio 01/22/2024 20.8  7.0 - 25.0 Final    Anion Gap 01/22/2024 12.5  5.0 - 15.0 mmol/L Final    eGFR 01/22/2024 119.0  >60.0 mL/min/1.73 Final    Color, UA 01/22/2024 Yellow  Yellow, Straw Final    Appearance, UA 01/22/2024 Clear  Clear Final    pH, UA 01/22/2024 5.5  5.0 - 8.0 Final    Specific Gravity, UA 01/22/2024 1.028  1.005 - 1.030 Final    Glucose, UA 01/22/2024 Negative  Negative Final    Ketones, UA 01/22/2024  Trace (A)  Negative Final    Bilirubin, UA 01/22/2024 Negative  Negative Final    Blood, UA 01/22/2024 Negative  Negative Final    Protein, UA 01/22/2024 Negative  Negative Final    Leuk Esterase, UA 01/22/2024 Negative  Negative Final    Nitrite, UA 01/22/2024 Negative  Negative Final    Urobilinogen, UA 01/22/2024 0.2 E.U./dL  0.2 - 1.0 E.U./dL Final    THC, Screen, Urine 01/22/2024 Negative  Negative Final    Phencyclidine (PCP), Urine 01/22/2024 Negative  Negative Final    Cocaine Screen, Urine 01/22/2024 Negative  Negative Final    Methamphetamine, Ur 01/22/2024 Positive (A)  Negative Final    Opiate Screen 01/22/2024 Negative  Negative Final    Amphetamine Screen, Urine 01/22/2024 Negative  Negative Final    Benzodiazepine Screen, Urine 01/22/2024 Negative  Negative Final    Tricyclic Antidepressants Screen 01/22/2024 Negative  Negative Final    Methadone Screen, Urine 01/22/2024 Negative  Negative Final    Barbiturates Screen, Urine 01/22/2024 Negative  Negative Final    Oxycodone Screen, Urine 01/22/2024 Negative  Negative Final    Buprenorphine, Screen, Urine 01/22/2024 Positive (A)  Negative Final    Ethanol 01/22/2024 337 (H)  0 - 10 mg/dL Final    Ethanol % 01/22/2024 0.337  % Final    Magnesium 01/22/2024 2.2  1.6 - 2.6 mg/dL Final    TSH 01/22/2024 1.670  0.270 - 4.200 uIU/mL Final    WBC 01/22/2024 5.14  3.40 - 10.80 10*3/mm3 Final    RBC 01/22/2024 4.22  4.14 - 5.80 10*6/mm3 Final    Hemoglobin 01/22/2024 14.3  13.0 - 17.7 g/dL Final    Hematocrit 01/22/2024 43.0  37.5 - 51.0 % Final    MCV 01/22/2024 101.9 (H)  79.0 - 97.0 fL Final    MCH 01/22/2024 33.9 (H)  26.6 - 33.0 pg Final    MCHC 01/22/2024 33.3  31.5 - 35.7 g/dL Final    RDW 01/22/2024 13.6  12.3 - 15.4 % Final    RDW-SD 01/22/2024 51.6  37.0 - 54.0 fl Final    MPV 01/22/2024 9.9  6.0 - 12.0 fL Final    Platelets 01/22/2024 216  140 - 450 10*3/mm3 Final    Neutrophil % 01/22/2024 53.3  42.7 - 76.0 % Final    Lymphocyte % 01/22/2024 33.1   19.6 - 45.3 % Final    Monocyte % 01/22/2024 6.6  5.0 - 12.0 % Final    Eosinophil % 01/22/2024 5.4  0.3 - 6.2 % Final    Basophil % 01/22/2024 1.2  0.0 - 1.5 % Final    Immature Grans % 01/22/2024 0.4  0.0 - 0.5 % Final    Neutrophils, Absolute 01/22/2024 2.74  1.70 - 7.00 10*3/mm3 Final    Lymphocytes, Absolute 01/22/2024 1.70  0.70 - 3.10 10*3/mm3 Final    Monocytes, Absolute 01/22/2024 0.34  0.10 - 0.90 10*3/mm3 Final    Eosinophils, Absolute 01/22/2024 0.28  0.00 - 0.40 10*3/mm3 Final    Basophils, Absolute 01/22/2024 0.06  0.00 - 0.20 10*3/mm3 Final    Immature Grans, Absolute 01/22/2024 0.02  0.00 - 0.05 10*3/mm3 Final    nRBC 01/22/2024 0.0  0.0 - 0.2 /100 WBC Final    Extra Tube 01/22/2024 Hold for add-ons.   Final    Auto resulted.    Extra Tube 01/22/2024 hold for add-on   Final    Auto resulted    Extra Tube 01/22/2024 Hold for add-ons.   Final    Auto resulted.    Extra Tube 01/22/2024 Hold for add-ons.   Final    Auto resulted    Hepatitis B Surface Ag 01/22/2024 Non-Reactive  Non-Reactive Final    Hep A IgM 01/22/2024 Non-Reactive  Non-Reactive Final    Hep B C IgM 01/22/2024 Non-Reactive  Non-Reactive Final    Hepatitis C Ab 01/22/2024 Reactive (A)  Non-Reactive Final    Fentanyl, Urine 01/22/2024 Negative  Negative Final    Ethanol 01/23/2024 221 (H)  0 - 10 mg/dL Final    Ethanol % 01/23/2024 0.221  % Final    Ethanol 01/23/2024 116 (H)  0 - 10 mg/dL Final    Ethanol % 01/23/2024 0.116  % Final    Ethanol 01/23/2024 103 (H)  0 - 10 mg/dL Final    Ethanol % 01/23/2024 0.103  % Final        Condition on Discharge:  improved    Vital Signs  Temp:  [97.6 °F (36.4 °C)-98.6 °F (37 °C)] 97.8 °F (36.6 °C)  Heart Rate:  [] 68  Resp:  [16-20] 16  BP: (110-140)/(64-92) 110/64      Discharge Disposition:  Rehab Facility or Unit (DC - External)    Discharge Medications:     Discharge Medications        Continue These Medications        Instructions Start Date   albuterol sulfate  (90 Base) MCG/ACT  inhaler  Commonly known as: PROVENTIL HFA;VENTOLIN HFA;PROAIR HFA   2 puffs, Inhalation, Every 4 Hours PRN      buprenorphine-naloxone 8-2 MG per SL tablet  Commonly known as: SUBOXONE   1.5 tablets, Sublingual, Daily      naloxone 4 MG/0.1ML nasal spray  Commonly known as: NARCAN   1 spray, Nasal, As Needed               Discharge Diet: Regular     Activity at Discharge: As tolerated     Follow-up Appointments  [facility]      Time: I spent  < 30  minutes on this discharge activity which included: face-to-face encounter with the patient, reviewing the data in the system, coordination of the care with the nursing staff as well as consultants, documentation, and entering orders.        Clinician:   Camron Beckwith MD  01/29/24  12:19 EST

## 2024-01-29 NOTE — PLAN OF CARE
"Goal Outcome Evaluation:  Plan of Care Reviewed With: patient  Patient Agreement with Plan of Care: agrees     Progress: improving  Outcome Evaluation: Pt states he's \"doing much better\" today. Pt still c/o body aches as his primary w/d sx. Pt rates anxiety/4/10, denies depression and rates craving \"really bad\" a 10/10. Pt denies SI/HI/AVH.  Pt reported difficulty sleeping early in the night, but appeared to sleep throughout the remainder of the night.                             "

## 2024-01-29 NOTE — PLAN OF CARE
Problem: Adult Behavioral Health Plan of Care  Goal: Plan of Care Review  Outcome: Adequate for Care Transition  Flowsheets  Taken 1/29/2024 1222  Plan of Care Reviewed With: patient  Patient Agreement with Plan of Care: agrees  Taken 1/29/2024 0815  Plan of Care Reviewed With: patient  Patient Agreement with Plan of Care: agrees  Goal: Patient-Specific Goal (Individualization)  Outcome: Adequate for Care Transition  Goal: Adheres to Safety Considerations for Self and Others  Outcome: Adequate for Care Transition  Intervention: Develop and Maintain Individualized Safety Plan  Recent Flowsheet Documentation  Taken 1/29/2024 1200 by Kitty Payan, RN  Safety Measures:   environmental rounds completed   safety plan reviewed   safety rounds completed  Taken 1/29/2024 1000 by Kitty Payan RN  Safety Measures:   environmental rounds completed   safety plan reviewed   safety rounds completed  Taken 1/29/2024 0800 by Kitty Payan, RN  Safety Measures:   environmental rounds completed   safety plan reviewed   safety rounds completed  Goal: Absence of New-Onset Illness or Injury  Outcome: Adequate for Care Transition  Intervention: Identify and Manage Fall Risk  Recent Flowsheet Documentation  Taken 1/29/2024 1200 by Kitty Payan, RN  Safety Measures:   environmental rounds completed   safety plan reviewed   safety rounds completed  Taken 1/29/2024 1000 by Kitty Payan, RN  Safety Measures:   environmental rounds completed   safety plan reviewed   safety rounds completed  Taken 1/29/2024 0800 by Kitty Payan, RN  Safety Measures:   environmental rounds completed   safety plan reviewed   safety rounds completed  Goal: Optimized Coping Skills in Response to Life Stressors  Outcome: Adequate for Care Transition  Intervention: Promote Effective Coping Strategies  Recent Flowsheet Documentation  Taken 1/29/2024 0815 by Kitty Payan, RN  Supportive Measures: active listening  utilized  Goal: Develops/Participates in Therapeutic Horse Shoe to Support Successful Transition  Outcome: Adequate for Care Transition  Intervention: Foster Therapeutic Horse Shoe  Recent Flowsheet Documentation  Taken 1/29/2024 0815 by Kitty Payan, RN  Trust Relationship/Rapport: care explained   Goal Outcome Evaluation:  Plan of Care Reviewed With: patient  Patient Agreement with Plan of Care: agrees

## 2024-01-30 NOTE — PAYOR COMM NOTE
"Aniceto Toledo (38 y.o. Male)       Date of Birth   1985    Social Security Number       Address   [address] [place] [address]    Home Phone   [phone]    MRN   [id number]       Hoahaoism   None    Marital Status   Legally                             Admission Date   1/25/24    Admission Type   Emergency    Admitting Provider   Bruce Sampson MD    Attending Provider       Department, Room/Bed   [facility] ADULT CD, 1043/1S       Discharge Date   1/29/2024    Discharge Disposition   Rehab Facility or Unit (DC - External)    Discharge Destination   Other                              Attending Provider: (none)   Allergies: No Known Allergies    Isolation: None   Infection: None   Code Status: Prior    Ht: 167.6 cm (66\")   Wt: 67.1 kg (148 lb)    Admission Cmt: None   Principal Problem: Alcohol use disorder, severe, dependence [F10.20]                   Active Insurance as of 1/25/2024       Primary Coverage       Payor Plan Insurance Group Employer/Plan Group    [facility] [employer] [place] [facility] [employer] [place]        Payor Plan Address Payor Plan Phone Number Payor Plan Fax Number Effective Dates    [address]   12/1/2023 - None Entered    [facility] [id number]         Subscriber Name Subscriber Birth Date Member ID       ANICETO TOLEDO 1985 [id number]                     Emergency Contacts        (Rel.) Home Phone Work Phone Mobile Phone    Harmony Rivera (Friend) [phone] -- --          PLEASE ATTACH THIS DISCHARGE INFORMATION TO AUTH. # [id number]. ATT: TAN     DISCHARGE DATE:  01/29/2024    Discharge Diagnosis:  Principal Problem:    Alcohol use disorder, severe, dependence (F10.20)  Active Problems:    Alcohol withdrawal (F10.939)    Opioid use disorder, severe, dependence (F11.20)    Methamphetamine use disorder, severe (F15.20)    Nicotine use disorder    FOLLOW UP:  [facility]   [facility] [place], [place] " [id number]  [phone]     1/29/2024    Medication List  Medication List   Morning Afternoon Evening Bedtime As Needed   albuterol sulfate  (90 Base) MCG/ACT inhaler  Commonly known as: PROVENTIL HFA;VENTOLIN HFA;PROAIR HFA  Inhale 2 puffs Every 4 (Four) Hours As Needed for Wheezing.  For: Spasm of Lung Air Passages     Every four hours if needed   buprenorphine-naloxone 8-2 MG per SL tablet  Commonly known as: SUBOXONE  Place 1.5 tablets under the tongue Daily.  For: Opioid Use Disorder (Continuation of Therapy)  Last time this was given: 1.5 tablets on January 29, 2024  8:21 AM        naloxone 4 MG/0.1ML nasal spray  Commonly known as: NARCAN  1 spray into the nostril(s) as directed by provider As Needed for Opioid Reversal.  For: Opioid Overdose             Suly Chino, [facility]   Counselor  Psychiatry     Case Management/Social Work      Signed     Date of Service: 01/29/24 1515  Creation Time: 01/29/24 1714     Signed         ..Bridge Session  Date: 1/29/2024                                  Time: 1140     Data:  Reason for Inpatient Admission: alcohol , opioid and meth use      Follow up: [facility]   [address] [place] [id number]  [phone]     1/29/2024        Coping Skills to Utilize: Patient encouraged to engage in physical activity, mindfulness, negative thought redirection and engaging their support system.     Crisis Safety Plan:  Support System to utilize and contact numbers: Patients friend and patient has contact number     Educated on crisis hotline numbers (yes/no): Yes     Was the Patient made aware of contact information for the following: community mental health centers, crisis stabilization programs, residential programs, , etc (yes/no): Yes     Will transportation be a barrier (yes/no): No  If so, explain solution(s) to resolve barrier: n/a     How and where will the patient obtain prescribed medications: Patients medications were filled today by [facility] and brought to patient.  The cost of the medication was covered by insurance.        Assessment: Patient is denying suicidal ideation today and denying homicidal ideation today.  Patient reports decrease in Depression today and decrease in Anxiety today.  Patient successful in identifying healthy coping and protective factors this date.  Patient is future oriented and ready for discharge.          Plan:    Discussed the importance of follow up treatment for continuity of care. The Patient was able to verbalize understanding and commitment to the individualized aftercare and crisis safety plan.        Suly Chino [facility]                       Discharge Summary        Camron Beckwith MD at 24 1219          :  1985  MRN:  [id number]  Visit Number:  [id number]      Date of Admission:2024   Date of Discharge:  2024    Discharge Diagnosis:  Principal Problem:    Alcohol use disorder, severe, dependence  Active Problems:    Alcohol withdrawal    Opioid use disorder, severe, dependence    Methamphetamine use disorder, severe    Nicotine use disorder        Admission Diagnosis:  Alcohol abuse [F10.10]     RYAN Toledo is a 38 y.o. male who was admitted on 2024 with complaints of alcohol, opioid and meth use and withdrawals.   For details please see H&P dated 24.     Hospital Course  Patient is a 38 y.o. male presented with alcohol use and withdrawals. The patient was admitted to the [facility] detox recovery unit for safety, further evaluation and treatment.  The patient was started on Ativan detox and he was able to complete the detox without any adverse events. Patient had elevated blood pressure initially but it improved over the course of treatment.   The patient was also able to take part in individual and group counseling sessions and work on appropriate coping skills.  The patient made steady improvement in his withdrawals and mood and  "expressed feeling more positive and hopeful about future. Sleep and appetite were improved.  The day of discharge the patient was calm, cooperative and pleasant. Mood was reported to be good, and denied SI/HI/AVH. Also reported no medication side effects.        Mental Status Exam upon discharge:   Mood \"good\"   Affect-congruent, appropriate, stable  Thought Content-goal directed, no delusional material present  Thought process-linear, organized.  Suicidality: No SI  Homicidality: No HI  Perception: No AH/VH    Procedures Performed         Consults:   Consults       No orders found from 12/27/2023 to 1/26/2024.            Pertinent Test Results:   Admission on 01/25/2024   Component Date Value Ref Range Status    QT Interval 01/25/2024 424  ms Final    QTC Interval 01/25/2024 444  ms Final   Admission on 01/24/2024, Discharged on 01/25/2024   Component Date Value Ref Range Status    Ethanol 01/24/2024 76 (H)  0 - 10 mg/dL Final    Ethanol % 01/24/2024 0.076  % Final    Glucose 01/24/2024 215 (H)  65 - 99 mg/dL Final    BUN 01/24/2024 10  6 - 20 mg/dL Final    Creatinine 01/24/2024 0.65 (L)  0.76 - 1.27 mg/dL Final    Sodium 01/24/2024 140  136 - 145 mmol/L Final    Potassium 01/24/2024 3.9  3.5 - 5.2 mmol/L Final    Slight hemolysis detected by analyzer. Result may be falsely elevated.    Chloride 01/24/2024 101  98 - 107 mmol/L Final    CO2 01/24/2024 23.9  22.0 - 29.0 mmol/L Final    Calcium 01/24/2024 8.9  8.6 - 10.5 mg/dL Final    Total Protein 01/24/2024 6.7  6.0 - 8.5 g/dL Final    Albumin 01/24/2024 3.8  3.5 - 5.2 g/dL Final    ALT (SGPT) 01/24/2024 213 (H)  1 - 41 U/L Final    AST (SGOT) 01/24/2024 289 (H)  1 - 40 U/L Final    Alkaline Phosphatase 01/24/2024 142 (H)  39 - 117 U/L Final    Total Bilirubin 01/24/2024 0.7  0.0 - 1.2 mg/dL Final    Globulin 01/24/2024 2.9  gm/dL Final    A/G Ratio 01/24/2024 1.3  g/dL Final    BUN/Creatinine Ratio 01/24/2024 15.4  7.0 - 25.0 Final    Anion Gap 01/24/2024 15.1 " (H)  5.0 - 15.0 mmol/L Final    eGFR 01/24/2024 125.2  >60.0 mL/min/1.73 Final    Magnesium 01/24/2024 1.7  1.6 - 2.6 mg/dL Final    Color, UA 01/24/2024 Yellow  Yellow, Straw Final    Appearance, UA 01/24/2024 Clear  Clear Final    pH, UA 01/24/2024 7.0  5.0 - 8.0 Final    Specific Gravity, UA 01/24/2024 1.015  1.005 - 1.030 Final    Glucose, UA 01/24/2024 Negative  Negative Final    Ketones, UA 01/24/2024 Negative  Negative Final    Bilirubin, UA 01/24/2024 Negative  Negative Final    Blood, UA 01/24/2024 Negative  Negative Final    Protein, UA 01/24/2024 Negative  Negative Final    Leuk Esterase, UA 01/24/2024 Negative  Negative Final    Nitrite, UA 01/24/2024 Negative  Negative Final    Urobilinogen, UA 01/24/2024 1.0 E.U./dL  0.2 - 1.0 E.U./dL Final    THC, Screen, Urine 01/24/2024 Negative  Negative Final    Phencyclidine (PCP), Urine 01/24/2024 Negative  Negative Final    Cocaine Screen, Urine 01/24/2024 Negative  Negative Final    Methamphetamine, Ur 01/24/2024 Negative  Negative Final    Opiate Screen 01/24/2024 Negative  Negative Final    Amphetamine Screen, Urine 01/24/2024 Negative  Negative Final    Benzodiazepine Screen, Urine 01/24/2024 Negative  Negative Final    Tricyclic Antidepressants Screen 01/24/2024 Negative  Negative Final    Methadone Screen, Urine 01/24/2024 Negative  Negative Final    Barbiturates Screen, Urine 01/24/2024 Negative  Negative Final    Oxycodone Screen, Urine 01/24/2024 Negative  Negative Final    Buprenorphine, Screen, Urine 01/24/2024 Positive (A)  Negative Final    Fentanyl, Urine 01/24/2024 Negative  Negative Final    Extra Tube 01/24/2024 Hold for add-ons.   Final    Auto resulted.    Extra Tube 01/24/2024 hold for add-on   Final    Auto resulted    Extra Tube 01/24/2024 Hold for add-ons.   Final    Auto resulted.    Extra Tube 01/24/2024 Hold for add-ons.   Final    Auto resulted    WBC 01/24/2024 4.20  3.40 - 10.80 10*3/mm3 Final    RBC 01/24/2024 4.00 (L)  4.14 - 5.80  10*6/mm3 Final    Hemoglobin 01/24/2024 13.5  13.0 - 17.7 g/dL Final    Hematocrit 01/24/2024 40.3  37.5 - 51.0 % Final    MCV 01/24/2024 100.8 (H)  79.0 - 97.0 fL Final    MCH 01/24/2024 33.8 (H)  26.6 - 33.0 pg Final    MCHC 01/24/2024 33.5  31.5 - 35.7 g/dL Final    RDW 01/24/2024 13.4  12.3 - 15.4 % Final    RDW-SD 01/24/2024 50.0  37.0 - 54.0 fl Final    MPV 01/24/2024 9.2  6.0 - 12.0 fL Final    Platelets 01/24/2024 165  140 - 450 10*3/mm3 Final    Neutrophil % 01/24/2024 33.4 (L)  42.7 - 76.0 % Final    Lymphocyte % 01/24/2024 53.1 (H)  19.6 - 45.3 % Final    Monocyte % 01/24/2024 8.1  5.0 - 12.0 % Final    Eosinophil % 01/24/2024 4.5  0.3 - 6.2 % Final    Basophil % 01/24/2024 0.7  0.0 - 1.5 % Final    Immature Grans % 01/24/2024 0.2  0.0 - 0.5 % Final    Neutrophils, Absolute 01/24/2024 1.40 (L)  1.70 - 7.00 10*3/mm3 Final    Lymphocytes, Absolute 01/24/2024 2.23  0.70 - 3.10 10*3/mm3 Final    Monocytes, Absolute 01/24/2024 0.34  0.10 - 0.90 10*3/mm3 Final    Eosinophils, Absolute 01/24/2024 0.19  0.00 - 0.40 10*3/mm3 Final    Basophils, Absolute 01/24/2024 0.03  0.00 - 0.20 10*3/mm3 Final    Immature Grans, Absolute 01/24/2024 0.01  0.00 - 0.05 10*3/mm3 Final    nRBC 01/24/2024 0.0  0.0 - 0.2 /100 WBC Final    TSH 01/24/2024 1.610  0.270 - 4.200 uIU/mL Final   Admission on 01/22/2024, Discharged on 01/23/2024   Component Date Value Ref Range Status    Glucose 01/22/2024 128 (H)  65 - 99 mg/dL Final    BUN 01/22/2024 16  6 - 20 mg/dL Final    Creatinine 01/22/2024 0.77  0.76 - 1.27 mg/dL Final    Sodium 01/22/2024 143  136 - 145 mmol/L Final    Potassium 01/22/2024 4.2  3.5 - 5.2 mmol/L Final    Slight hemolysis detected by analyzer. Result may be falsely elevated.    Chloride 01/22/2024 105  98 - 107 mmol/L Final    CO2 01/22/2024 25.5  22.0 - 29.0 mmol/L Final    Calcium 01/22/2024 8.8  8.6 - 10.5 mg/dL Final    Total Protein 01/22/2024 7.1  6.0 - 8.5 g/dL Final    Albumin 01/22/2024 4.2  3.5 - 5.2 g/dL  Final    ALT (SGPT) 01/22/2024 237 (H)  1 - 41 U/L Final    AST (SGOT) 01/22/2024 618 (H)  1 - 40 U/L Final    Alkaline Phosphatase 01/22/2024 115  39 - 117 U/L Final    Total Bilirubin 01/22/2024 0.5  0.0 - 1.2 mg/dL Final    Globulin 01/22/2024 2.9  gm/dL Final    A/G Ratio 01/22/2024 1.4  g/dL Final    BUN/Creatinine Ratio 01/22/2024 20.8  7.0 - 25.0 Final    Anion Gap 01/22/2024 12.5  5.0 - 15.0 mmol/L Final    eGFR 01/22/2024 119.0  >60.0 mL/min/1.73 Final    Color, UA 01/22/2024 Yellow  Yellow, Straw Final    Appearance, UA 01/22/2024 Clear  Clear Final    pH, UA 01/22/2024 5.5  5.0 - 8.0 Final    Specific Gravity, UA 01/22/2024 1.028  1.005 - 1.030 Final    Glucose, UA 01/22/2024 Negative  Negative Final    Ketones, UA 01/22/2024 Trace (A)  Negative Final    Bilirubin, UA 01/22/2024 Negative  Negative Final    Blood, UA 01/22/2024 Negative  Negative Final    Protein, UA 01/22/2024 Negative  Negative Final    Leuk Esterase, UA 01/22/2024 Negative  Negative Final    Nitrite, UA 01/22/2024 Negative  Negative Final    Urobilinogen, UA 01/22/2024 0.2 E.U./dL  0.2 - 1.0 E.U./dL Final    THC, Screen, Urine 01/22/2024 Negative  Negative Final    Phencyclidine (PCP), Urine 01/22/2024 Negative  Negative Final    Cocaine Screen, Urine 01/22/2024 Negative  Negative Final    Methamphetamine, Ur 01/22/2024 Positive (A)  Negative Final    Opiate Screen 01/22/2024 Negative  Negative Final    Amphetamine Screen, Urine 01/22/2024 Negative  Negative Final    Benzodiazepine Screen, Urine 01/22/2024 Negative  Negative Final    Tricyclic Antidepressants Screen 01/22/2024 Negative  Negative Final    Methadone Screen, Urine 01/22/2024 Negative  Negative Final    Barbiturates Screen, Urine 01/22/2024 Negative  Negative Final    Oxycodone Screen, Urine 01/22/2024 Negative  Negative Final    Buprenorphine, Screen, Urine 01/22/2024 Positive (A)  Negative Final    Ethanol 01/22/2024 337 (H)  0 - 10 mg/dL Final    Ethanol % 01/22/2024  0.337  % Final    Magnesium 01/22/2024 2.2  1.6 - 2.6 mg/dL Final    TSH 01/22/2024 1.670  0.270 - 4.200 uIU/mL Final    WBC 01/22/2024 5.14  3.40 - 10.80 10*3/mm3 Final    RBC 01/22/2024 4.22  4.14 - 5.80 10*6/mm3 Final    Hemoglobin 01/22/2024 14.3  13.0 - 17.7 g/dL Final    Hematocrit 01/22/2024 43.0  37.5 - 51.0 % Final    MCV 01/22/2024 101.9 (H)  79.0 - 97.0 fL Final    MCH 01/22/2024 33.9 (H)  26.6 - 33.0 pg Final    MCHC 01/22/2024 33.3  31.5 - 35.7 g/dL Final    RDW 01/22/2024 13.6  12.3 - 15.4 % Final    RDW-SD 01/22/2024 51.6  37.0 - 54.0 fl Final    MPV 01/22/2024 9.9  6.0 - 12.0 fL Final    Platelets 01/22/2024 216  140 - 450 10*3/mm3 Final    Neutrophil % 01/22/2024 53.3  42.7 - 76.0 % Final    Lymphocyte % 01/22/2024 33.1  19.6 - 45.3 % Final    Monocyte % 01/22/2024 6.6  5.0 - 12.0 % Final    Eosinophil % 01/22/2024 5.4  0.3 - 6.2 % Final    Basophil % 01/22/2024 1.2  0.0 - 1.5 % Final    Immature Grans % 01/22/2024 0.4  0.0 - 0.5 % Final    Neutrophils, Absolute 01/22/2024 2.74  1.70 - 7.00 10*3/mm3 Final    Lymphocytes, Absolute 01/22/2024 1.70  0.70 - 3.10 10*3/mm3 Final    Monocytes, Absolute 01/22/2024 0.34  0.10 - 0.90 10*3/mm3 Final    Eosinophils, Absolute 01/22/2024 0.28  0.00 - 0.40 10*3/mm3 Final    Basophils, Absolute 01/22/2024 0.06  0.00 - 0.20 10*3/mm3 Final    Immature Grans, Absolute 01/22/2024 0.02  0.00 - 0.05 10*3/mm3 Final    nRBC 01/22/2024 0.0  0.0 - 0.2 /100 WBC Final    Extra Tube 01/22/2024 Hold for add-ons.   Final    Auto resulted.    Extra Tube 01/22/2024 hold for add-on   Final    Auto resulted    Extra Tube 01/22/2024 Hold for add-ons.   Final    Auto resulted.    Extra Tube 01/22/2024 Hold for add-ons.   Final    Auto resulted    Hepatitis B Surface Ag 01/22/2024 Non-Reactive  Non-Reactive Final    Hep A IgM 01/22/2024 Non-Reactive  Non-Reactive Final    Hep B C IgM 01/22/2024 Non-Reactive  Non-Reactive Final    Hepatitis C Ab 01/22/2024 Reactive (A)  Non-Reactive  Final    Fentanyl, Urine 01/22/2024 Negative  Negative Final    Ethanol 01/23/2024 221 (H)  0 - 10 mg/dL Final    Ethanol % 01/23/2024 0.221  % Final    Ethanol 01/23/2024 116 (H)  0 - 10 mg/dL Final    Ethanol % 01/23/2024 0.116  % Final    Ethanol 01/23/2024 103 (H)  0 - 10 mg/dL Final    Ethanol % 01/23/2024 0.103  % Final        Condition on Discharge:  improved    Vital Signs  Temp:  [97.6 °F (36.4 °C)-98.6 °F (37 °C)] 97.8 °F (36.6 °C)  Heart Rate:  [] 68  Resp:  [16-20] 16  BP: (110-140)/(64-92) 110/64      Discharge Disposition:  Rehab Facility or Unit (DC - External)    Discharge Medications:     Discharge Medications        Continue These Medications        Instructions Start Date   albuterol sulfate  (90 Base) MCG/ACT inhaler  Commonly known as: PROVENTIL HFA;VENTOLIN HFA;PROAIR HFA   2 puffs, Inhalation, Every 4 Hours PRN      buprenorphine-naloxone 8-2 MG per SL tablet  Commonly known as: SUBOXONE   1.5 tablets, Sublingual, Daily      naloxone 4 MG/0.1ML nasal spray  Commonly known as: NARCAN   1 spray, Nasal, As Needed               Discharge Diet: Regular     Activity at Discharge: As tolerated     Follow-up Appointments  [facility]      Time: I spent  < 30  minutes on this discharge activity which included: face-to-face encounter with the patient, reviewing the data in the system, coordination of the care with the nursing staff as well as consultants, documentation, and entering orders.        Clinician:   Camron Beckwith MD  01/29/24  12:19 EST    Electronically signed by Camron Beckwith MD at 01/29/24 [id number]
